# Patient Record
Sex: FEMALE | URBAN - METROPOLITAN AREA
[De-identification: names, ages, dates, MRNs, and addresses within clinical notes are randomized per-mention and may not be internally consistent; named-entity substitution may affect disease eponyms.]

---

## 2018-03-22 ENCOUNTER — HOSPITAL ENCOUNTER (OUTPATIENT)
Dept: MAMMOGRAPHY | Age: 68
Discharge: HOME | End: 2018-03-22
Admitting: FAMILY MEDICINE

## 2018-03-22 DIAGNOSIS — Z12.31 ENCOUNTER FOR SCREENING MAMMOGRAM FOR BREAST CANCER: ICD-10-CM

## 2018-03-22 DIAGNOSIS — Z78.0 POSTMENOPAUSAL: ICD-10-CM

## 2019-10-25 ENCOUNTER — APPOINTMENT (OUTPATIENT)
Dept: GENERAL RADIOLOGY | Age: 69
End: 2019-10-25
Payer: COMMERCIAL

## 2019-10-25 ENCOUNTER — HOSPITAL ENCOUNTER (EMERGENCY)
Age: 69
Discharge: HOME | End: 2019-10-26
Attending: EMERGENCY MEDICINE
Payer: COMMERCIAL

## 2019-10-25 DIAGNOSIS — T50.B95A ADVERSE EFFECT OF INFLUENZA VACCINE, INITIAL ENCOUNTER: ICD-10-CM

## 2019-10-25 DIAGNOSIS — R73.9 HYPERGLYCEMIA: Primary | ICD-10-CM

## 2019-10-25 DIAGNOSIS — Z86.39 HX OF DIABETES MELLITUS: ICD-10-CM

## 2019-10-25 PROCEDURE — 96360 HYDRATION IV INFUSION INIT: CPT

## 2019-10-25 PROCEDURE — 85025 COMPLETE CBC W/AUTO DIFF WBC: CPT

## 2019-10-25 PROCEDURE — 83690 ASSAY OF LIPASE: CPT

## 2019-10-25 PROCEDURE — 80053 COMPREHEN METABOLIC PANEL: CPT

## 2019-10-25 PROCEDURE — 81003 URINALYSIS AUTO W/O SCOPE: CPT

## 2019-10-25 PROCEDURE — 99283 EMERGENCY DEPT VISIT LOW MDM: CPT

## 2019-10-25 PROCEDURE — 2580000003 HC RX 258: Performed by: PHYSICIAN ASSISTANT

## 2019-10-25 PROCEDURE — 71046 X-RAY EXAM CHEST 2 VIEWS: CPT

## 2019-10-25 PROCEDURE — 87804 INFLUENZA ASSAY W/OPTIC: CPT

## 2019-10-25 PROCEDURE — 6370000000 HC RX 637 (ALT 250 FOR IP): Performed by: PHYSICIAN ASSISTANT

## 2019-10-25 RX ORDER — ACETAMINOPHEN 500 MG
1000 TABLET ORAL ONCE
Status: COMPLETED | OUTPATIENT
Start: 2019-10-25 | End: 2019-10-25

## 2019-10-25 RX ORDER — IBUPROFEN 600 MG/1
600 TABLET ORAL ONCE
Status: COMPLETED | OUTPATIENT
Start: 2019-10-25 | End: 2019-10-25

## 2019-10-25 RX ORDER — LISINOPRIL 20 MG/1
20 TABLET ORAL DAILY
COMMUNITY

## 2019-10-25 RX ORDER — 0.9 % SODIUM CHLORIDE 0.9 %
1000 INTRAVENOUS SOLUTION INTRAVENOUS ONCE
Status: COMPLETED | OUTPATIENT
Start: 2019-10-25 | End: 2019-10-26

## 2019-10-25 RX ORDER — OMEPRAZOLE 20 MG/1
20 CAPSULE, DELAYED RELEASE ORAL DAILY
COMMUNITY

## 2019-10-25 RX ORDER — ATORVASTATIN CALCIUM 10 MG/1
10 TABLET, FILM COATED ORAL DAILY
COMMUNITY

## 2019-10-25 RX ADMIN — SODIUM CHLORIDE 1000 ML: 9 INJECTION, SOLUTION INTRAVENOUS at 23:51

## 2019-10-25 RX ADMIN — ACETAMINOPHEN 1000 MG: 500 TABLET, FILM COATED ORAL at 23:25

## 2019-10-25 RX ADMIN — IBUPROFEN 600 MG: 600 TABLET, FILM COATED ORAL at 23:26

## 2019-10-25 ASSESSMENT — ENCOUNTER SYMPTOMS
SHORTNESS OF BREATH: 0
VOMITING: 1
NAUSEA: 1
WHEEZING: 0
ABDOMINAL PAIN: 1
DIARRHEA: 0

## 2019-10-25 ASSESSMENT — PAIN SCALES - GENERAL
PAINLEVEL_OUTOF10: 9

## 2019-10-26 VITALS
RESPIRATION RATE: 18 BRPM | DIASTOLIC BLOOD PRESSURE: 72 MMHG | HEART RATE: 58 BPM | TEMPERATURE: 98 F | OXYGEN SATURATION: 98 % | SYSTOLIC BLOOD PRESSURE: 104 MMHG

## 2019-10-26 LAB
A/G RATIO: 1.4 (ref 1.1–2.2)
ALBUMIN SERPL-MCNC: 4.4 G/DL (ref 3.4–5)
ALP BLD-CCNC: 106 U/L (ref 40–129)
ALT SERPL-CCNC: 42 U/L (ref 10–40)
ANION GAP SERPL CALCULATED.3IONS-SCNC: 14 MMOL/L (ref 3–16)
AST SERPL-CCNC: 33 U/L (ref 15–37)
BASOPHILS ABSOLUTE: 0 K/UL (ref 0–0.2)
BASOPHILS RELATIVE PERCENT: 0.4 %
BILIRUB SERPL-MCNC: 1 MG/DL (ref 0–1)
BILIRUBIN URINE: NEGATIVE
BLOOD, URINE: NEGATIVE
BUN BLDV-MCNC: 5 MG/DL (ref 7–20)
CALCIUM SERPL-MCNC: 9.8 MG/DL (ref 8.3–10.6)
CHLORIDE BLD-SCNC: 99 MMOL/L (ref 99–110)
CLARITY: CLEAR
CO2: 22 MMOL/L (ref 21–32)
COLOR: YELLOW
CREAT SERPL-MCNC: 0.7 MG/DL (ref 0.6–1.2)
EOSINOPHILS ABSOLUTE: 0.1 K/UL (ref 0–0.6)
EOSINOPHILS RELATIVE PERCENT: 0.5 %
GFR AFRICAN AMERICAN: >60
GFR NON-AFRICAN AMERICAN: >60
GLOBULIN: 3.2 G/DL
GLUCOSE BLD-MCNC: 180 MG/DL (ref 70–99)
GLUCOSE URINE: NEGATIVE MG/DL
HCT VFR BLD CALC: 41.1 % (ref 36–48)
HEMOGLOBIN: 13.9 G/DL (ref 12–16)
KETONES, URINE: NEGATIVE MG/DL
LEUKOCYTE ESTERASE, URINE: NEGATIVE
LIPASE: 59 U/L (ref 13–60)
LYMPHOCYTES ABSOLUTE: 0.5 K/UL (ref 1–5.1)
LYMPHOCYTES RELATIVE PERCENT: 4.4 %
MCH RBC QN AUTO: 28.5 PG (ref 26–34)
MCHC RBC AUTO-ENTMCNC: 33.8 G/DL (ref 31–36)
MCV RBC AUTO: 84.2 FL (ref 80–100)
MICROSCOPIC EXAMINATION: NORMAL
MONOCYTES ABSOLUTE: 0.6 K/UL (ref 0–1.3)
MONOCYTES RELATIVE PERCENT: 5.3 %
NEUTROPHILS ABSOLUTE: 10.1 K/UL (ref 1.7–7.7)
NEUTROPHILS RELATIVE PERCENT: 89.4 %
NITRITE, URINE: NEGATIVE
PDW BLD-RTO: 14.6 % (ref 12.4–15.4)
PH UA: 7.5 (ref 5–8)
PLATELET # BLD: 138 K/UL (ref 135–450)
PMV BLD AUTO: 9.6 FL (ref 5–10.5)
POTASSIUM REFLEX MAGNESIUM: 4.3 MMOL/L (ref 3.5–5.1)
PROTEIN UA: NEGATIVE MG/DL
RAPID INFLUENZA  B AGN: NEGATIVE
RAPID INFLUENZA A AGN: NEGATIVE
RBC # BLD: 4.89 M/UL (ref 4–5.2)
SODIUM BLD-SCNC: 135 MMOL/L (ref 136–145)
SPECIFIC GRAVITY UA: 1.01 (ref 1–1.03)
TOTAL PROTEIN: 7.6 G/DL (ref 6.4–8.2)
URINE REFLEX TO CULTURE: NORMAL
URINE TYPE: NORMAL
UROBILINOGEN, URINE: 0.2 E.U./DL
WBC # BLD: 11.3 K/UL (ref 4–11)

## 2023-07-23 ENCOUNTER — TELEPHONE (OUTPATIENT)
Dept: ORTHOPEDIC SURGERY | Age: 73
End: 2023-07-23

## 2023-09-25 ENCOUNTER — HOSPITAL ENCOUNTER (INPATIENT)
Age: 73
LOS: 3 days | Discharge: HOME | End: 2023-09-28
Attending: INTERNAL MEDICINE | Admitting: STUDENT IN AN ORGANIZED HEALTH CARE EDUCATION/TRAINING PROGRAM
Payer: MEDICAID

## 2023-09-25 ENCOUNTER — APPOINTMENT (OUTPATIENT)
Dept: CT IMAGING | Age: 73
End: 2023-09-25
Payer: MEDICAID

## 2023-09-25 DIAGNOSIS — R11.14 BILIOUS VOMITING WITH NAUSEA: ICD-10-CM

## 2023-09-25 DIAGNOSIS — E87.1 HYPONATREMIA: ICD-10-CM

## 2023-09-25 DIAGNOSIS — R10.9 ACUTE ABDOMINAL PAIN: ICD-10-CM

## 2023-09-25 DIAGNOSIS — R00.1 SYMPTOMATIC BRADYCARDIA: Primary | ICD-10-CM

## 2023-09-25 LAB
ALBUMIN SERPL-MCNC: 4.3 G/DL (ref 3.4–5)
ALP SERPL-CCNC: 109 U/L (ref 40–129)
ALT SERPL-CCNC: 19 U/L (ref 10–40)
ANION GAP SERPL CALCULATED.3IONS-SCNC: 11 MMOL/L (ref 3–16)
AST SERPL-CCNC: 23 U/L (ref 15–37)
BACTERIA URNS QL MICRO: NORMAL /HPF
BASOPHILS # BLD: 0 K/UL (ref 0–0.2)
BASOPHILS NFR BLD: 0.3 %
BILIRUB DIRECT SERPL-MCNC: <0.2 MG/DL (ref 0–0.3)
BILIRUB INDIRECT SERPL-MCNC: NORMAL MG/DL (ref 0–1)
BILIRUB SERPL-MCNC: 0.7 MG/DL (ref 0–1)
BILIRUB UR QL STRIP.AUTO: NEGATIVE
BUN SERPL-MCNC: 5 MG/DL (ref 7–20)
CALCIUM SERPL-MCNC: 9.5 MG/DL (ref 8.3–10.6)
CHLORIDE SERPL-SCNC: 96 MMOL/L (ref 99–110)
CLARITY UR: CLEAR
CO2 SERPL-SCNC: 23 MMOL/L (ref 21–32)
COLOR UR: YELLOW
CREAT SERPL-MCNC: 0.7 MG/DL (ref 0.6–1.2)
DEPRECATED RDW RBC AUTO: 17.4 % (ref 12.4–15.4)
EOSINOPHIL # BLD: 0.1 K/UL (ref 0–0.6)
EOSINOPHIL NFR BLD: 0.5 %
EPI CELLS #/AREA URNS AUTO: 1 /HPF (ref 0–5)
GFR SERPLBLD CREATININE-BSD FMLA CKD-EPI: >60 ML/MIN/{1.73_M2}
GLUCOSE SERPL-MCNC: 147 MG/DL (ref 70–99)
GLUCOSE UR STRIP.AUTO-MCNC: NEGATIVE MG/DL
HCT VFR BLD AUTO: 34.2 % (ref 36–48)
HGB BLD-MCNC: 10.9 G/DL (ref 12–16)
HGB UR QL STRIP.AUTO: NEGATIVE
HYALINE CASTS #/AREA URNS AUTO: 0 /LPF (ref 0–8)
KETONES UR STRIP.AUTO-MCNC: NEGATIVE MG/DL
LEUKOCYTE ESTERASE UR QL STRIP.AUTO: ABNORMAL
LIPASE SERPL-CCNC: 42 U/L (ref 13–60)
LYMPHOCYTES # BLD: 0.6 K/UL (ref 1–5.1)
LYMPHOCYTES NFR BLD: 5.3 %
MCH RBC QN AUTO: 24.4 PG (ref 26–34)
MCHC RBC AUTO-ENTMCNC: 32 G/DL (ref 31–36)
MCV RBC AUTO: 76.1 FL (ref 80–100)
MONOCYTES # BLD: 0.8 K/UL (ref 0–1.3)
MONOCYTES NFR BLD: 7.6 %
NEUTROPHILS # BLD: 9.5 K/UL (ref 1.7–7.7)
NEUTROPHILS NFR BLD: 86.3 %
NITRITE UR QL STRIP.AUTO: NEGATIVE
PH UR STRIP.AUTO: 7 [PH] (ref 5–8)
PLATELET # BLD AUTO: 151 K/UL (ref 135–450)
PMV BLD AUTO: 9 FL (ref 5–10.5)
POTASSIUM SERPL-SCNC: 4.6 MMOL/L (ref 3.5–5.1)
PROT SERPL-MCNC: 7.6 G/DL (ref 6.4–8.2)
PROT UR STRIP.AUTO-MCNC: NEGATIVE MG/DL
RBC # BLD AUTO: 4.5 M/UL (ref 4–5.2)
RBC CLUMPS #/AREA URNS AUTO: 0 /HPF (ref 0–4)
SODIUM SERPL-SCNC: 130 MMOL/L (ref 136–145)
SP GR UR STRIP.AUTO: 1.01 (ref 1–1.03)
TROPONIN, HIGH SENSITIVITY: 13 NG/L (ref 0–14)
UA COMPLETE W REFLEX CULTURE PNL UR: ABNORMAL
UA DIPSTICK W REFLEX MICRO PNL UR: YES
URN SPEC COLLECT METH UR: ABNORMAL
UROBILINOGEN UR STRIP-ACNC: 0.2 E.U./DL
WBC # BLD AUTO: 11 K/UL (ref 4–11)
WBC #/AREA URNS AUTO: 1 /HPF (ref 0–5)

## 2023-09-25 PROCEDURE — 96375 TX/PRO/DX INJ NEW DRUG ADDON: CPT

## 2023-09-25 PROCEDURE — 2000000000 HC ICU R&B

## 2023-09-25 PROCEDURE — 80048 BASIC METABOLIC PNL TOTAL CA: CPT

## 2023-09-25 PROCEDURE — 74177 CT ABD & PELVIS W/CONTRAST: CPT

## 2023-09-25 PROCEDURE — 96374 THER/PROPH/DIAG INJ IV PUSH: CPT

## 2023-09-25 PROCEDURE — 93005 ELECTROCARDIOGRAM TRACING: CPT | Performed by: INTERNAL MEDICINE

## 2023-09-25 PROCEDURE — 83690 ASSAY OF LIPASE: CPT

## 2023-09-25 PROCEDURE — 6360000002 HC RX W HCPCS: Performed by: INTERNAL MEDICINE

## 2023-09-25 PROCEDURE — 80076 HEPATIC FUNCTION PANEL: CPT

## 2023-09-25 PROCEDURE — 81001 URINALYSIS AUTO W/SCOPE: CPT

## 2023-09-25 PROCEDURE — 99285 EMERGENCY DEPT VISIT HI MDM: CPT

## 2023-09-25 PROCEDURE — 84484 ASSAY OF TROPONIN QUANT: CPT

## 2023-09-25 PROCEDURE — 85025 COMPLETE CBC W/AUTO DIFF WBC: CPT

## 2023-09-25 PROCEDURE — 2580000003 HC RX 258: Performed by: INTERNAL MEDICINE

## 2023-09-25 PROCEDURE — 96361 HYDRATE IV INFUSION ADD-ON: CPT

## 2023-09-25 PROCEDURE — 6360000004 HC RX CONTRAST MEDICATION: Performed by: INTERNAL MEDICINE

## 2023-09-25 RX ORDER — INSULIN LISPRO 100 [IU]/ML
0-8 INJECTION, SOLUTION INTRAVENOUS; SUBCUTANEOUS
Status: DISCONTINUED | OUTPATIENT
Start: 2023-09-26 | End: 2023-09-26

## 2023-09-25 RX ORDER — ONDANSETRON 2 MG/ML
4 INJECTION INTRAMUSCULAR; INTRAVENOUS EVERY 6 HOURS PRN
Status: DISCONTINUED | OUTPATIENT
Start: 2023-09-25 | End: 2023-09-28 | Stop reason: HOSPADM

## 2023-09-25 RX ORDER — LORATADINE 10 MG/1
10 TABLET ORAL DAILY
COMMUNITY

## 2023-09-25 RX ORDER — ONDANSETRON 2 MG/ML
4 INJECTION INTRAMUSCULAR; INTRAVENOUS ONCE
Status: COMPLETED | OUTPATIENT
Start: 2023-09-25 | End: 2023-09-25

## 2023-09-25 RX ORDER — SODIUM CHLORIDE 9 MG/ML
INJECTION, SOLUTION INTRAVENOUS PRN
Status: DISCONTINUED | OUTPATIENT
Start: 2023-09-25 | End: 2023-09-28 | Stop reason: HOSPADM

## 2023-09-25 RX ORDER — LISINOPRIL 20 MG/1
20 TABLET ORAL DAILY
Status: DISCONTINUED | OUTPATIENT
Start: 2023-09-26 | End: 2023-09-25

## 2023-09-25 RX ORDER — PANTOPRAZOLE SODIUM 40 MG/1
40 TABLET, DELAYED RELEASE ORAL
Status: DISCONTINUED | OUTPATIENT
Start: 2023-09-26 | End: 2023-09-28 | Stop reason: HOSPADM

## 2023-09-25 RX ORDER — VALSARTAN 80 MG/1
80 TABLET ORAL DAILY
COMMUNITY

## 2023-09-25 RX ORDER — DEXTROSE MONOHYDRATE 100 MG/ML
INJECTION, SOLUTION INTRAVENOUS CONTINUOUS PRN
Status: DISCONTINUED | OUTPATIENT
Start: 2023-09-25 | End: 2023-09-28 | Stop reason: SDUPTHER

## 2023-09-25 RX ORDER — FENTANYL CITRATE 50 UG/ML
25 INJECTION, SOLUTION INTRAMUSCULAR; INTRAVENOUS ONCE
Status: COMPLETED | OUTPATIENT
Start: 2023-09-25 | End: 2023-09-25

## 2023-09-25 RX ORDER — ONDANSETRON 4 MG/1
4 TABLET, ORALLY DISINTEGRATING ORAL EVERY 8 HOURS PRN
Status: DISCONTINUED | OUTPATIENT
Start: 2023-09-25 | End: 2023-09-28 | Stop reason: HOSPADM

## 2023-09-25 RX ORDER — ENOXAPARIN SODIUM 100 MG/ML
40 INJECTION SUBCUTANEOUS DAILY
Status: DISCONTINUED | OUTPATIENT
Start: 2023-09-26 | End: 2023-09-26

## 2023-09-25 RX ORDER — ACETAMINOPHEN 650 MG/1
650 SUPPOSITORY RECTAL EVERY 6 HOURS PRN
Status: DISCONTINUED | OUTPATIENT
Start: 2023-09-25 | End: 2023-09-28 | Stop reason: HOSPADM

## 2023-09-25 RX ORDER — GLUCAGON 1 MG/ML
1 KIT INJECTION PRN
Status: DISCONTINUED | OUTPATIENT
Start: 2023-09-25 | End: 2023-09-28 | Stop reason: SDUPTHER

## 2023-09-25 RX ORDER — SODIUM CHLORIDE 0.9 % (FLUSH) 0.9 %
5-40 SYRINGE (ML) INJECTION EVERY 12 HOURS SCHEDULED
Status: DISCONTINUED | OUTPATIENT
Start: 2023-09-25 | End: 2023-09-28 | Stop reason: HOSPADM

## 2023-09-25 RX ORDER — 0.9 % SODIUM CHLORIDE 0.9 %
500 INTRAVENOUS SOLUTION INTRAVENOUS ONCE
Status: COMPLETED | OUTPATIENT
Start: 2023-09-25 | End: 2023-09-25

## 2023-09-25 RX ORDER — INSULIN LISPRO 100 [IU]/ML
0-4 INJECTION, SOLUTION INTRAVENOUS; SUBCUTANEOUS NIGHTLY
Status: DISCONTINUED | OUTPATIENT
Start: 2023-09-25 | End: 2023-09-26 | Stop reason: DRUGHIGH

## 2023-09-25 RX ORDER — ACETAMINOPHEN 325 MG/1
650 TABLET ORAL EVERY 6 HOURS PRN
Status: DISCONTINUED | OUTPATIENT
Start: 2023-09-25 | End: 2023-09-28 | Stop reason: HOSPADM

## 2023-09-25 RX ORDER — ATROPINE SULFATE 0.1 MG/ML
0.5 INJECTION INTRAVENOUS ONCE
Status: COMPLETED | OUTPATIENT
Start: 2023-09-25 | End: 2023-09-25

## 2023-09-25 RX ORDER — POLYETHYLENE GLYCOL 3350 17 G/17G
17 POWDER, FOR SOLUTION ORAL DAILY PRN
Status: DISCONTINUED | OUTPATIENT
Start: 2023-09-25 | End: 2023-09-28 | Stop reason: HOSPADM

## 2023-09-25 RX ORDER — SODIUM CHLORIDE 0.9 % (FLUSH) 0.9 %
5-40 SYRINGE (ML) INJECTION PRN
Status: DISCONTINUED | OUTPATIENT
Start: 2023-09-25 | End: 2023-09-28 | Stop reason: HOSPADM

## 2023-09-25 RX ORDER — ATORVASTATIN CALCIUM 10 MG/1
10 TABLET, FILM COATED ORAL DAILY
Status: DISCONTINUED | OUTPATIENT
Start: 2023-09-26 | End: 2023-09-28 | Stop reason: HOSPADM

## 2023-09-25 RX ADMIN — SODIUM CHLORIDE 500 ML: 9 INJECTION, SOLUTION INTRAVENOUS at 17:11

## 2023-09-25 RX ADMIN — ATROPINE SULFATE 0.5 MG: 0.1 INJECTION, SOLUTION ENDOTRACHEAL; INTRAMUSCULAR; INTRAVENOUS; SUBCUTANEOUS at 20:50

## 2023-09-25 RX ADMIN — IOPAMIDOL 75 ML: 755 INJECTION, SOLUTION INTRAVENOUS at 17:40

## 2023-09-25 RX ADMIN — FENTANYL CITRATE 25 MCG: 0.05 INJECTION, SOLUTION INTRAMUSCULAR; INTRAVENOUS at 17:03

## 2023-09-25 RX ADMIN — ONDANSETRON 4 MG: 2 INJECTION INTRAMUSCULAR; INTRAVENOUS at 17:10

## 2023-09-25 ASSESSMENT — PAIN SCALES - GENERAL
PAINLEVEL_OUTOF10: 0
PAINLEVEL_OUTOF10: 10
PAINLEVEL_OUTOF10: 9

## 2023-09-25 ASSESSMENT — PAIN DESCRIPTION - PAIN TYPE: TYPE: ACUTE PAIN

## 2023-09-25 ASSESSMENT — PAIN - FUNCTIONAL ASSESSMENT: PAIN_FUNCTIONAL_ASSESSMENT: 0-10

## 2023-09-25 ASSESSMENT — PAIN DESCRIPTION - LOCATION
LOCATION: ABDOMEN
LOCATION: ABDOMEN;HEAD

## 2023-09-25 ASSESSMENT — PAIN DESCRIPTION - DESCRIPTORS: DESCRIPTORS: ACHING

## 2023-09-25 NOTE — ED PROVIDER NOTES
EMERGENCY MEDICINE PROVIDER NOTE    Patient Identification  Pt Name: Luli Barrett  MRN: [id number]  [address] [facility] 1950  Date of evaluation: 9/25/2023  Provider: Miguel Rosas DO  PCP: [address] [place] [address] [address]    Chief Complaint  Emesis and Abdominal Pain (Used English - c/o abd pain and vomiting since this morning x 4 episodes. Denies fevers. )      HPI  (History provided by patient)  This is a 67 y.o. female who was brought in by self for generalized abdominal pain along with nausea and vomiting x4 started this morning. The pain is moderate to severe in nature. Patient did not take any medication prior to arrival.  She does have some back pain. She is also complaining of some diarrhea. She denies constipation. Patient denies fever and chills. Patient does speak English and  was used. I have reviewed the following nursing documentation:  Allergies: Patient has no known allergies. Past medical history:   Past Medical History:   Diagnosis Date    Diabetes mellitus ([address])      Past surgical history: History reviewed. No pertinent surgical history. Home medications:   Previous Medications    ATORVASTATIN (LIPITOR) 10 MG TABLET    Take 10 mg by mouth daily    CALCIUM CARBONATE (OSCAL) 500 MG TABS TABLET    Take 500 mg by mouth daily. LISINOPRIL (PRINIVIL;ZESTRIL) 20 MG TABLET    Take 20 mg by mouth daily    METFORMIN (GLUCOPHAGE) 500 MG TABLET    Take 500 mg by mouth daily (with breakfast). OMEPRAZOLE (PRILOSEC) 20 MG DELAYED RELEASE CAPSULE    Take 20 mg by mouth daily    SIMVASTATIN (ZOCOR) 10 MG TABLET    Take 10 mg by mouth nightly. Social history:  reports that she has never smoked. She has never used smokeless tobacco. She reports that she does not drink alcohol and does not use drugs. Family history:  History reviewed. No pertinent family history.     Exam  ED Triage Vitals [09/25/23 1500]   BP Temp Temp Source Pulse Respirations SpO2 Height Weight   (!) 170/72

## 2023-09-26 PROBLEM — I15.2 HYPERTENSION ASSOCIATED WITH DIABETES (HCC): Status: ACTIVE | Noted: 2023-09-26

## 2023-09-26 PROBLEM — E78.5 HYPERLIPIDEMIA ASSOCIATED WITH TYPE 2 DIABETES MELLITUS (HCC): Status: ACTIVE | Noted: 2023-09-26

## 2023-09-26 PROBLEM — E11.59 HYPERTENSION ASSOCIATED WITH DIABETES (HCC): Status: ACTIVE | Noted: 2023-09-26

## 2023-09-26 PROBLEM — R10.9 ACUTE ABDOMINAL PAIN: Status: ACTIVE | Noted: 2023-09-26

## 2023-09-26 PROBLEM — E11.69 HYPERLIPIDEMIA ASSOCIATED WITH TYPE 2 DIABETES MELLITUS (HCC): Status: ACTIVE | Noted: 2023-09-26

## 2023-09-26 PROBLEM — E78.2 MIXED HYPERLIPIDEMIA: Status: ACTIVE | Noted: 2023-09-26

## 2023-09-26 PROBLEM — E11.65 TYPE 2 DIABETES MELLITUS WITH HYPERGLYCEMIA, WITHOUT LONG-TERM CURRENT USE OF INSULIN (HCC): Status: ACTIVE | Noted: 2023-09-26

## 2023-09-26 LAB
ANION GAP SERPL CALCULATED.3IONS-SCNC: 8 MMOL/L (ref 3–16)
BASOPHILS # BLD: 0 K/UL (ref 0–0.2)
BASOPHILS NFR BLD: 0.3 %
BUN SERPL-MCNC: 5 MG/DL (ref 7–20)
CALCIUM SERPL-MCNC: 8.8 MG/DL (ref 8.3–10.6)
CHLORIDE SERPL-SCNC: 106 MMOL/L (ref 99–110)
CO2 SERPL-SCNC: 25 MMOL/L (ref 21–32)
CORTIS AM PEAK SERPL-MCNC: 15.2 UG/DL (ref 4.3–22.4)
CREAT SERPL-MCNC: 0.7 MG/DL (ref 0.6–1.2)
DEPRECATED RDW RBC AUTO: 17.5 % (ref 12.4–15.4)
EKG ATRIAL RATE: 50 BPM
EKG DIAGNOSIS: NORMAL
EKG P AXIS: 12 DEGREES
EKG P-R INTERVAL: 128 MS
EKG Q-T INTERVAL: 466 MS
EKG QRS DURATION: 94 MS
EKG QTC CALCULATION (BAZETT): 424 MS
EKG R AXIS: 20 DEGREES
EKG T AXIS: 65 DEGREES
EKG VENTRICULAR RATE: 50 BPM
EOSINOPHIL # BLD: 0.1 K/UL (ref 0–0.6)
EOSINOPHIL NFR BLD: 1.3 %
GFR SERPLBLD CREATININE-BSD FMLA CKD-EPI: >60 ML/MIN/{1.73_M2}
GLUCOSE BLD-MCNC: 101 MG/DL (ref 70–99)
GLUCOSE BLD-MCNC: 155 MG/DL (ref 70–99)
GLUCOSE BLD-MCNC: 171 MG/DL (ref 70–99)
GLUCOSE BLD-MCNC: 190 MG/DL (ref 70–99)
GLUCOSE SERPL-MCNC: 165 MG/DL (ref 70–99)
HCT VFR BLD AUTO: 31.1 % (ref 36–48)
HGB BLD-MCNC: 10 G/DL (ref 12–16)
LYMPHOCYTES # BLD: 1.1 K/UL (ref 1–5.1)
LYMPHOCYTES NFR BLD: 25.6 %
MCH RBC QN AUTO: 24.4 PG (ref 26–34)
MCHC RBC AUTO-ENTMCNC: 32.2 G/DL (ref 31–36)
MCV RBC AUTO: 75.7 FL (ref 80–100)
MONOCYTES # BLD: 0.5 K/UL (ref 0–1.3)
MONOCYTES NFR BLD: 11 %
NEUTROPHILS # BLD: 2.5 K/UL (ref 1.7–7.7)
NEUTROPHILS NFR BLD: 61.8 %
PERFORMED ON: ABNORMAL
PLATELET # BLD AUTO: 148 K/UL (ref 135–450)
PMV BLD AUTO: 9.2 FL (ref 5–10.5)
POTASSIUM SERPL-SCNC: 4.3 MMOL/L (ref 3.5–5.1)
RBC # BLD AUTO: 4.11 M/UL (ref 4–5.2)
SODIUM SERPL-SCNC: 139 MMOL/L (ref 136–145)
TROPONIN, HIGH SENSITIVITY: 11 NG/L (ref 0–14)
TROPONIN, HIGH SENSITIVITY: 11 NG/L (ref 0–14)
TROPONIN, HIGH SENSITIVITY: 12 NG/L (ref 0–14)
TSH SERPL DL<=0.005 MIU/L-ACNC: 0.74 UIU/ML (ref 0.27–4.2)
WBC # BLD AUTO: 4.1 K/UL (ref 4–11)

## 2023-09-26 PROCEDURE — 2580000003 HC RX 258: Performed by: STUDENT IN AN ORGANIZED HEALTH CARE EDUCATION/TRAINING PROGRAM

## 2023-09-26 PROCEDURE — 6360000002 HC RX W HCPCS: Performed by: STUDENT IN AN ORGANIZED HEALTH CARE EDUCATION/TRAINING PROGRAM

## 2023-09-26 PROCEDURE — 93010 ELECTROCARDIOGRAM REPORT: CPT | Performed by: INTERNAL MEDICINE

## 2023-09-26 PROCEDURE — 85025 COMPLETE CBC W/AUTO DIFF WBC: CPT

## 2023-09-26 PROCEDURE — 99223 1ST HOSP IP/OBS HIGH 75: CPT | Performed by: INTERNAL MEDICINE

## 2023-09-26 PROCEDURE — 36415 COLL VENOUS BLD VENIPUNCTURE: CPT

## 2023-09-26 PROCEDURE — 6370000000 HC RX 637 (ALT 250 FOR IP): Performed by: STUDENT IN AN ORGANIZED HEALTH CARE EDUCATION/TRAINING PROGRAM

## 2023-09-26 PROCEDURE — 1200000000 HC SEMI PRIVATE

## 2023-09-26 PROCEDURE — 84484 ASSAY OF TROPONIN QUANT: CPT

## 2023-09-26 PROCEDURE — 93306 TTE W/DOPPLER COMPLETE: CPT

## 2023-09-26 PROCEDURE — 84443 ASSAY THYROID STIM HORMONE: CPT

## 2023-09-26 PROCEDURE — 82533 TOTAL CORTISOL: CPT

## 2023-09-26 PROCEDURE — 80048 BASIC METABOLIC PNL TOTAL CA: CPT

## 2023-09-26 PROCEDURE — 93017 CV STRESS TEST TRACING ONLY: CPT | Performed by: INTERNAL MEDICINE

## 2023-09-26 RX ORDER — ATROPINE SULFATE 0.1 MG/ML
1 INJECTION INTRAVENOUS EVERY 4 HOURS PRN
Status: DISCONTINUED | OUTPATIENT
Start: 2023-09-26 | End: 2023-09-26

## 2023-09-26 RX ORDER — DOPAMINE HYDROCHLORIDE 160 MG/100ML
1-20 INJECTION, SOLUTION INTRAVENOUS CONTINUOUS
Status: DISCONTINUED | OUTPATIENT
Start: 2023-09-26 | End: 2023-09-26

## 2023-09-26 RX ORDER — SODIUM CHLORIDE 9 MG/ML
INJECTION, SOLUTION INTRAVENOUS CONTINUOUS
Status: ACTIVE | OUTPATIENT
Start: 2023-09-26 | End: 2023-09-26

## 2023-09-26 RX ORDER — INSULIN LISPRO 100 [IU]/ML
0-8 INJECTION, SOLUTION INTRAVENOUS; SUBCUTANEOUS EVERY 6 HOURS
Status: DISCONTINUED | OUTPATIENT
Start: 2023-09-26 | End: 2023-09-28

## 2023-09-26 RX ORDER — ENOXAPARIN SODIUM 100 MG/ML
30 INJECTION SUBCUTANEOUS DAILY
Status: DISCONTINUED | OUTPATIENT
Start: 2023-09-26 | End: 2023-09-27 | Stop reason: ALTCHOICE

## 2023-09-26 RX ADMIN — SODIUM CHLORIDE: 9 INJECTION, SOLUTION INTRAVENOUS at 03:20

## 2023-09-26 RX ADMIN — SODIUM CHLORIDE, PRESERVATIVE FREE 10 ML: 5 INJECTION INTRAVENOUS at 00:20

## 2023-09-26 RX ADMIN — DOPAMINE HYDROCHLORIDE 3 MCG/KG/MIN: 160 INJECTION, SOLUTION INTRAVENOUS at 03:15

## 2023-09-26 RX ADMIN — ATROPINE SULFATE 1 MG: 0.1 INJECTION INTRAVENOUS at 04:00

## 2023-09-26 RX ADMIN — SODIUM CHLORIDE, PRESERVATIVE FREE 10 ML: 5 INJECTION INTRAVENOUS at 08:56

## 2023-09-26 RX ADMIN — ATORVASTATIN CALCIUM 10 MG: 10 TABLET, FILM COATED ORAL at 08:56

## 2023-09-26 RX ADMIN — SODIUM CHLORIDE, PRESERVATIVE FREE 10 ML: 5 INJECTION INTRAVENOUS at 20:53

## 2023-09-26 ASSESSMENT — PAIN SCALES - GENERAL
PAINLEVEL_OUTOF10: 0
PAINLEVEL_OUTOF10: 0

## 2023-09-26 NOTE — H&P
Negative 09/25/2023 06:36 PM    COLORU Yellow 09/25/2023 06:36 PM    PHUR 7.0 09/25/2023 06:36 PM    WBCUA 1 09/25/2023 06:36 PM    RBCUA 0 09/25/2023 06:36 PM    BACTERIA None Seen 09/25/2023 06:36 PM    CLARITYU Clear 09/25/2023 06:36 PM    SPECGRAV 1.015 09/25/2023 06:36 PM    LEUKOCYTESUR TRACE 09/25/2023 06:36 PM    UROBILINOGEN 0.2 09/25/2023 06:36 PM    BILIRUBINUR Negative 09/25/2023 06:36 PM    BILIRUBINUR Negative 06/27/2011 02:03 PM    BLOODU Negative 09/25/2023 06:36 PM    GLUCOSEU Negative 09/25/2023 06:36 PM    GLUCOSEU Negative 06/27/2011 02:03 PM    KETUA Negative 09/25/2023 06:36 PM     Urine Cultures: No results found for: \"LABURIN\"  Blood Cultures: No results found for: \"BC\"  No results found for: \"BLOODCULT2\"  Organism: No results found for: \"ORG\"    Imaging/Diagnostics Last 24 Hours   CT ABDOMEN PELVIS W IV CONTRAST Additional Contrast? None    Result Date: 9/25/2023  EXAMINATION: CT OF THE ABDOMEN AND PELVIS WITH CONTRAST 9/25/2023 4:57 pm TECHNIQUE: CT of the abdomen and pelvis was performed with the administration of intravenous contrast. Multiplanar reformatted images are provided for review. Automated exposure control, iterative reconstruction, and/or weight based adjustment of the mA/kV was utilized to reduce the radiation dose to as low as reasonably achievable. COMPARISON: None. HISTORY: ORDERING SYSTEM PROVIDED HISTORY: abd pain diffuse, N/V TECHNOLOGIST PROVIDED HISTORY: Reason for exam:->abd pain diffuse, N/V Additional Contrast?->None Decision Support Exception - unselect if not a suspected or confirmed emergency medical condition->Emergency Medical Condition (MA) Reason for Exam: Abd pain diffuse, N/V. Abdominal Pain (Used [facility] - c/o abd pain and vomiting since this morning x 4 episodes. Denies fevers. ). FINDINGS: Lower Chest:   Unremarkable. Organs:   Proximal pancreatic duct appears upper limits of normal in caliber. No focal pancreatic parenchymal lesion identified. Small simple right renal cyst measuring 2.1 cm. Otherwise unremarkable. GI/Bowel:   No acute obstruction. Probable mild constipation. Few colonic diverticula without acute diverticulitis. No acute appendicitis. No free air. No drainable fluid collection. Pelvis:   Unremarkable. Peritoneum/Retroperitoneum:   Mild-to-moderate atherosclerotic calcification of the abdominal aorta and major branch vessels. No adenopathy. Bones/Soft Tissues: No acute fracture or aggressive osseous lesion. Transitional lumbosacral anatomy. No acute abdominopelvic process. Additional nonemergent findings, as above. This note was likely completed using voice recognition technology and may contain unintended errors.      Electronically signed by Kiersten Chao MD on 9/26/2023 at 7:25 AM

## 2023-09-26 NOTE — PROGRESS NOTES
MD at bedside- verbal order to stop dopamine- walk patient around unit in 30-45 mins to see what patient heart rate does, if it is slow pt will require pace maker, language services used to communicate with family, care ongoing

## 2023-09-26 NOTE — PROGRESS NOTES
Pt arrived from ED. Transported via bed. Oriented and alert. Sinus Bradycardia. Asymptomatic. BP WNL. Ventilating on room air with good sats. No cough nor SOB noted. A bit unsteady on her legs when ambulating. Ambulates with hand held support. Urinary and bowel continent. Only speaks [place] language. Tried the  ipad but there were no available . NORBERT Devi helped with the translation making sure pt is not in pain and settled. Completed some of the admission checklist.  Properties documented. No other complaints noted.

## 2023-09-26 NOTE — PROGRESS NOTES
TRANSFER - OUT REPORT:    Verbal report given to *** on Loida Nolan  being transferred to [address] [place] [address] for routine progression of patient care       Report consisted of patient's Situation, Background, Assessment and   Recommendations(SBAR). Information from the following report(s) Nurse Handoff Report and Cardiac Rhythm SB  was reviewed with the receiving nurse. Lines:   Peripheral IV 09/25/23 Right;Dorsal Forearm (Active)   Site Assessment Clean, dry & intact 09/26/23 1600   Line Status Infusing 09/26/23 1600   Line Care Connections checked and tightened 09/26/23 1600   Phlebitis Assessment No symptoms 09/26/23 1600   Infiltration Assessment 0 09/26/23 1600   Alcohol Cap Used Yes 09/26/23 1600   Dressing Status Clean, dry & intact 09/26/23 1600   Dressing Type Transparent 09/26/23 1600        Opportunity for questions and clarification was provided.       Patient transported with:  Monitor

## 2023-09-26 NOTE — CONSULTS
Session ID: [id number]  Language: [facility]   ID: #[id number]   Name: Jacob Lemus

## 2023-09-26 NOTE — PROGRESS NOTES
Sinus bradycardia noted. HR has been dipping < 35 bpm several times. Dr. Song Revels already informed. Ordered and given Dopamine to be infused at 3 mcg/hr and NS at 75 mls/hr. Given atropine 1mg as prescribed. Pt is asymptomatic during this bradycardia episodes. BP reading >65mmHg. No other complaints noted.

## 2023-09-26 NOTE — ED NOTES
ED TO INPATIENT SBAR HANDOFF    Patient Name: Verito Guy   :  1950  67 y.o. MRN:  [id number]  Preferred Name    ED Room #:  ED-0023/  Family/Caregiver Present yes   Restraints no   Sitter no   Sepsis Risk Score Sepsis Risk Score: 1.22    Situation  Code Status: No Order No additional code details. Allergies: Patient has no known allergies. Weight: No data found. Arrived from: home  Chief Complaint:   Chief Complaint   Patient presents with    Emesis    Abdominal Pain     Used Maltese - c/o abd pain and vomiting since this morning x 4 episodes. Denies fevers. Hospital Problem/Diagnosis:  Principal Problem:    Symptomatic bradycardia  Resolved Problems:    * No resolved hospital problems. *    Imaging:   CT ABDOMEN PELVIS W IV CONTRAST Additional Contrast? None   Final Result   No acute abdominopelvic process. Additional nonemergent findings, as above.            Abnormal labs:   Abnormal Labs Reviewed   CBC WITH AUTO DIFFERENTIAL - Abnormal; Notable for the following components:       Result Value    Hemoglobin 10.9 (*)     Hematocrit 34.2 (*)     MCV 76.1 (*)     MCH 24.4 (*)     RDW 17.4 (*)     Neutrophils Absolute 9.5 (*)     Lymphocytes Absolute 0.6 (*)     All other components within normal limits   BASIC METABOLIC PANEL - Abnormal; Notable for the following components:    Sodium 130 (*)     Chloride 96 (*)     Glucose 147 (*)     BUN 5 (*)     All other components within normal limits   URINALYSIS WITH REFLEX TO CULTURE - Abnormal; Notable for the following components:    Leukocyte Esterase, Urine TRACE (*)     All other components within normal limits     Critical values: no     Abnormal Assessment Findings:     Background  History:   Past Medical History:   Diagnosis Date    Diabetes mellitus ([address])        Assessment    Vitals/MEWS: MEWS Score: 1  Level of Consciousness: Alert (0)   Vitals:    23 1945 23   BP: (!) 118/55 (!)

## 2023-09-26 NOTE — PROGRESS NOTES
Patient instructed on Erwin Protocol Stress Test Procedure including possible side effects. Verbalizes knowledge and understanding with  and daughter at bedside. Gait unsteady and pt very weak. Assisted on treadmill with RN x2.

## 2023-09-26 NOTE — CONSULTS
[facility]   Electrophysiology Consultation   Date: 9/26/2023  Reason for Consultation: bradycardia  Consult Requesting Physician: Sylvester Torres DO     Chief Complaint   Patient presents with    Emesis    Abdominal Pain     Used Albanian - c/o abd pain and vomiting since this morning x 4 episodes. Denies fevers. HPI: Sushila Brandt is a 67 y.o. female with history of hypertension hyperlipidemia, diabetes who presented with abdominal pain and nausea. She had also vomited. She was noted to be bradycardic in the ER. She received 0.5 mg of atropine and felt better afterwards. She was started on dopamine overnight. This morning her heart rate was in 50s on dopamine. We will stop the dopamine and her heart rate later during the day went down between 37 and 45. We tried to do a stress test but she was too weak to do it. Her heart rate increased to 90 but then dropped back to 30 and she became symptomatic and dizzy. She has history of thyroid issue and we are awaiting a result of thyroid studies. Past Medical History:   Diagnosis Date    Diabetes mellitus ([address])         History reviewed. No pertinent surgical history. No Known Allergies    Social History:  Reviewed. reports that she has never smoked. She has never used smokeless tobacco. She reports that she does not drink alcohol and does not use drugs. Family History:  Reviewed. Family Hx was Reviewed. No relevant family history is present. Review of System:  All other systems reviewed and are negative except for that noted above.  Pertinent negatives are:     General: negative for fever, chills   Ophthalmic ROS: negative for - eye pain or loss of vision  ENT ROS: negative for - headaches, sore throat   Respiratory: negative for - cough, sputum  Cardiovascular: Reviewed in HPI  Gastrointestinal: negative for - abdominal pain, diarrhea, N/V  Hematology: negative for - bleeding, blood clots, bruising or sinus syndrome. She does seem to be symptomatic when she is bradycardic. I will see if the results of her thyroid function comes back and if it is abnormal and consistent with hypothyroidism, she will need treatment of hypothyroidism. Otherwise since we have not found any other identifiable cause and she remains symptomatic, and if she does remain bradycardic on telemetry tomorrow, we will proceed with a pacemaker. I discussed this with her family and herself using an .      -Abdominal pain    Resolved. CT of the abdomen was normal.    -Hyperlipidemia    Continue statin. I independently reviewed and interpreted ECG, cardiac labs, other relevant labs,  echo   . Unless otherwise reflected in the note, my interpretation is in agreement with the report. Patient at high risk of decompensation due to severe symptomatic bradycardia. Thank you for allowing me to participate in the care of Yarinisha Gee     NOTE: This report was transcribed using voice recognition software. Every effort was made to ensure accuracy, however, inadvertent computerized transcription errors may be present.

## 2023-09-26 NOTE — PROGRESS NOTES
Pharmacy Home Medication Reconciliation Note    A medication reconciliation has been completed for Rose Riddle 1950    Pharmacy: [facility], [address] [place] [address] Sumanth Eaton  Information provided by: patient via     The patient's home medication list is as follows: No current facility-administered medications on file prior to encounter. Current Outpatient Medications on File Prior to Encounter   Medication Sig Dispense Refill    linagliptin (TRADJENTA) 5 MG tablet Take 1 tablet by mouth daily      loratadine (CLARITIN) 10 MG tablet Take 1 tablet by mouth daily      valsartan (DIOVAN) 80 MG tablet Take 1 tablet by mouth daily      metFORMIN (GLUCOPHAGE) 1000 MG tablet Take 1 tablet by mouth 2 times daily (with meals)      lisinopril (PRINIVIL;ZESTRIL) 20 MG tablet Take 20 mg by mouth daily (Patient not taking: Reported on 9/25/2023)      omeprazole (PRILOSEC) 20 MG delayed release capsule Take 1 capsule by mouth daily      atorvastatin (LIPITOR) 40 MG tablet Take 1 tablet by mouth daily      simvastatin (ZOCOR) 10 MG tablet Take 10 mg by mouth nightly. (Patient not taking: Reported on 9/25/2023)      metformin (GLUCOPHAGE) 500 MG tablet Take 500 mg by mouth daily (with breakfast). (Patient not taking: Reported on 9/25/2023)      calcium carbonate (OSCAL) 500 MG TABS tablet Take 1 tablet by mouth daily         Patient is no longer taking lisinopril or simvastatin. Of note, patient states she took AM meds today except Metformin and Tradjenta. Timing of last doses updated. Thank you,  Curtis Cooper Second

## 2023-09-26 NOTE — CONSULTS
Session ID: [id number]  Language: [facility]   ID: #[id number]   Name: Petros Escoto

## 2023-09-27 ENCOUNTER — APPOINTMENT (OUTPATIENT)
Dept: GENERAL RADIOLOGY | Age: 73
End: 2023-09-27
Payer: MEDICAID

## 2023-09-27 PROBLEM — Z95.0 PACEMAKER: Status: ACTIVE | Noted: 2023-09-27

## 2023-09-27 LAB
ALBUMIN SERPL-MCNC: 3.7 G/DL (ref 3.4–5)
ANION GAP SERPL CALCULATED.3IONS-SCNC: 7 MMOL/L (ref 3–16)
BASOPHILS # BLD: 0 K/UL (ref 0–0.2)
BASOPHILS NFR BLD: 0.3 %
BUN SERPL-MCNC: 6 MG/DL (ref 7–20)
CALCIUM SERPL-MCNC: 8.8 MG/DL (ref 8.3–10.6)
CHLORIDE SERPL-SCNC: 106 MMOL/L (ref 99–110)
CO2 SERPL-SCNC: 26 MMOL/L (ref 21–32)
CREAT SERPL-MCNC: 0.7 MG/DL (ref 0.6–1.2)
DEPRECATED RDW RBC AUTO: 17.3 % (ref 12.4–15.4)
EOSINOPHIL # BLD: 0.1 K/UL (ref 0–0.6)
EOSINOPHIL NFR BLD: 1.4 %
GFR SERPLBLD CREATININE-BSD FMLA CKD-EPI: >60 ML/MIN/{1.73_M2}
GLUCOSE BLD-MCNC: 124 MG/DL (ref 70–99)
GLUCOSE BLD-MCNC: 127 MG/DL (ref 70–99)
GLUCOSE BLD-MCNC: 147 MG/DL (ref 70–99)
GLUCOSE BLD-MCNC: 257 MG/DL (ref 70–99)
GLUCOSE BLD-MCNC: 312 MG/DL (ref 70–99)
GLUCOSE SERPL-MCNC: 132 MG/DL (ref 70–99)
HCT VFR BLD AUTO: 28.6 % (ref 36–48)
HGB BLD-MCNC: 9.2 G/DL (ref 12–16)
LYMPHOCYTES # BLD: 1.4 K/UL (ref 1–5.1)
LYMPHOCYTES NFR BLD: 28.2 %
MCH RBC QN AUTO: 24.1 PG (ref 26–34)
MCHC RBC AUTO-ENTMCNC: 32.1 G/DL (ref 31–36)
MCV RBC AUTO: 75.1 FL (ref 80–100)
MONOCYTES # BLD: 0.6 K/UL (ref 0–1.3)
MONOCYTES NFR BLD: 13.1 %
NEUTROPHILS # BLD: 2.8 K/UL (ref 1.7–7.7)
NEUTROPHILS NFR BLD: 57 %
PERFORMED ON: ABNORMAL
PHOSPHATE SERPL-MCNC: 5.1 MG/DL (ref 2.5–4.9)
PLATELET # BLD AUTO: 138 K/UL (ref 135–450)
PMV BLD AUTO: 9 FL (ref 5–10.5)
POTASSIUM SERPL-SCNC: 4.5 MMOL/L (ref 3.5–5.1)
RBC # BLD AUTO: 3.81 M/UL (ref 4–5.2)
SODIUM SERPL-SCNC: 139 MMOL/L (ref 136–145)
WBC # BLD AUTO: 4.9 K/UL (ref 4–11)

## 2023-09-27 PROCEDURE — C1889 IMPLANT/INSERT DEVICE, NOC: HCPCS

## 2023-09-27 PROCEDURE — 80069 RENAL FUNCTION PANEL: CPT

## 2023-09-27 PROCEDURE — 6360000002 HC RX W HCPCS: Performed by: INTERNAL MEDICINE

## 2023-09-27 PROCEDURE — 2580000003 HC RX 258

## 2023-09-27 PROCEDURE — 0JH606Z INSERTION OF PACEMAKER, DUAL CHAMBER INTO CHEST SUBCUTANEOUS TISSUE AND FASCIA, OPEN APPROACH: ICD-10-PCS | Performed by: INTERNAL MEDICINE

## 2023-09-27 PROCEDURE — 3E0132A INTRODUCTION OF ANTI-INFECTIVE ENVELOPE INTO SUBCUTANEOUS TISSUE, PERCUTANEOUS APPROACH: ICD-10-PCS | Performed by: INTERNAL MEDICINE

## 2023-09-27 PROCEDURE — 6370000000 HC RX 637 (ALT 250 FOR IP): Performed by: STUDENT IN AN ORGANIZED HEALTH CARE EDUCATION/TRAINING PROGRAM

## 2023-09-27 PROCEDURE — 36415 COLL VENOUS BLD VENIPUNCTURE: CPT

## 2023-09-27 PROCEDURE — 2580000003 HC RX 258: Performed by: STUDENT IN AN ORGANIZED HEALTH CARE EDUCATION/TRAINING PROGRAM

## 2023-09-27 PROCEDURE — 94760 N-INVAS EAR/PLS OXIMETRY 1: CPT

## 2023-09-27 PROCEDURE — C1769 GUIDE WIRE: HCPCS

## 2023-09-27 PROCEDURE — 6360000002 HC RX W HCPCS

## 2023-09-27 PROCEDURE — 99153 MOD SED SAME PHYS/QHP EA: CPT

## 2023-09-27 PROCEDURE — C1898 LEAD, PMKR, OTHER THAN TRANS: HCPCS

## 2023-09-27 PROCEDURE — 6370000000 HC RX 637 (ALT 250 FOR IP): Performed by: NURSE PRACTITIONER

## 2023-09-27 PROCEDURE — 1200000000 HC SEMI PRIVATE

## 2023-09-27 PROCEDURE — C1887 CATHETER, GUIDING: HCPCS

## 2023-09-27 PROCEDURE — 85025 COMPLETE CBC W/AUTO DIFF WBC: CPT

## 2023-09-27 PROCEDURE — 99152 MOD SED SAME PHYS/QHP 5/>YRS: CPT

## 2023-09-27 PROCEDURE — 2580000003 HC RX 258: Performed by: INTERNAL MEDICINE

## 2023-09-27 PROCEDURE — C1892 INTRO/SHEATH,FIXED,PEEL-AWAY: HCPCS

## 2023-09-27 PROCEDURE — 02H63JZ INSERTION OF PACEMAKER LEAD INTO RIGHT ATRIUM, PERCUTANEOUS APPROACH: ICD-10-PCS | Performed by: INTERNAL MEDICINE

## 2023-09-27 PROCEDURE — 2500000003 HC RX 250 WO HCPCS

## 2023-09-27 PROCEDURE — 71045 X-RAY EXAM CHEST 1 VIEW: CPT

## 2023-09-27 PROCEDURE — 33208 INSRT HEART PM ATRIAL & VENT: CPT | Performed by: INTERNAL MEDICINE

## 2023-09-27 PROCEDURE — 99152 MOD SED SAME PHYS/QHP 5/>YRS: CPT | Performed by: INTERNAL MEDICINE

## 2023-09-27 PROCEDURE — 33208 INSRT HEART PM ATRIAL & VENT: CPT

## 2023-09-27 PROCEDURE — 6370000000 HC RX 637 (ALT 250 FOR IP): Performed by: INTERNAL MEDICINE

## 2023-09-27 PROCEDURE — 02HK3JZ INSERTION OF PACEMAKER LEAD INTO RIGHT VENTRICLE, PERCUTANEOUS APPROACH: ICD-10-PCS | Performed by: INTERNAL MEDICINE

## 2023-09-27 PROCEDURE — C1785 PMKR, DUAL, RATE-RESP: HCPCS

## 2023-09-27 RX ORDER — SODIUM CHLORIDE 0.9 % (FLUSH) 0.9 %
5-40 SYRINGE (ML) INJECTION PRN
Status: DISCONTINUED | OUTPATIENT
Start: 2023-09-27 | End: 2023-09-28 | Stop reason: HOSPADM

## 2023-09-27 RX ORDER — OXYCODONE HYDROCHLORIDE 5 MG/1
10 TABLET ORAL EVERY 4 HOURS PRN
Status: DISCONTINUED | OUTPATIENT
Start: 2023-09-27 | End: 2023-09-28 | Stop reason: HOSPADM

## 2023-09-27 RX ORDER — OXYCODONE HYDROCHLORIDE 5 MG/1
5 TABLET ORAL EVERY 4 HOURS PRN
Status: DISCONTINUED | OUTPATIENT
Start: 2023-09-27 | End: 2023-09-28 | Stop reason: HOSPADM

## 2023-09-27 RX ORDER — SODIUM CHLORIDE 0.9 % (FLUSH) 0.9 %
5-40 SYRINGE (ML) INJECTION EVERY 12 HOURS SCHEDULED
Status: DISCONTINUED | OUTPATIENT
Start: 2023-09-27 | End: 2023-09-28 | Stop reason: HOSPADM

## 2023-09-27 RX ORDER — SODIUM CHLORIDE 9 MG/ML
INJECTION, SOLUTION INTRAVENOUS PRN
Status: DISCONTINUED | OUTPATIENT
Start: 2023-09-27 | End: 2023-09-28 | Stop reason: HOSPADM

## 2023-09-27 RX ORDER — CHLORHEXIDINE GLUCONATE 4 G/100ML
SOLUTION TOPICAL ONCE
Status: COMPLETED | OUTPATIENT
Start: 2023-09-27 | End: 2023-09-27

## 2023-09-27 RX ORDER — ENOXAPARIN SODIUM 100 MG/ML
40 INJECTION SUBCUTANEOUS DAILY
Status: DISCONTINUED | OUTPATIENT
Start: 2023-09-27 | End: 2023-09-28 | Stop reason: HOSPADM

## 2023-09-27 RX ADMIN — SODIUM CHLORIDE, PRESERVATIVE FREE 10 ML: 5 INJECTION INTRAVENOUS at 20:12

## 2023-09-27 RX ADMIN — CEFAZOLIN 2000 MG: 2 INJECTION, POWDER, FOR SOLUTION INTRAMUSCULAR; INTRAVENOUS at 13:12

## 2023-09-27 RX ADMIN — CHLORHEXIDINE GLUCONATE: 213 SOLUTION TOPICAL at 13:13

## 2023-09-27 RX ADMIN — PANTOPRAZOLE SODIUM 40 MG: 40 TABLET, DELAYED RELEASE ORAL at 05:20

## 2023-09-27 RX ADMIN — OXYCODONE HYDROCHLORIDE 5 MG: 5 TABLET ORAL at 20:12

## 2023-09-27 RX ADMIN — SODIUM CHLORIDE, PRESERVATIVE FREE 10 ML: 5 INJECTION INTRAVENOUS at 08:23

## 2023-09-27 ASSESSMENT — PAIN SCALES - GENERAL
PAINLEVEL_OUTOF10: 5
PAINLEVEL_OUTOF10: 0

## 2023-09-27 ASSESSMENT — PAIN DESCRIPTION - LOCATION: LOCATION: CHEST

## 2023-09-27 ASSESSMENT — PAIN DESCRIPTION - ORIENTATION: ORIENTATION: LEFT

## 2023-09-27 ASSESSMENT — PAIN DESCRIPTION - PAIN TYPE: TYPE: SURGICAL PAIN

## 2023-09-27 ASSESSMENT — PAIN - FUNCTIONAL ASSESSMENT: PAIN_FUNCTIONAL_ASSESSMENT: PREVENTS OR INTERFERES SOME ACTIVE ACTIVITIES AND ADLS

## 2023-09-27 NOTE — DISCHARGE INSTRUCTIONS
Post-Device Implant     1. Wound Care    -Bathe daily but keep incision dry and clean until your 7-10 day follow up    -Do not shower until you are told to do so by your physician.    -Do not remove the outer dressing unless it is soiled    -Allow the thin pieces of tape (Steri-Strips) to fall off naturally. Do not pick or pull at these unless instructed to do so by your physician. 2. Contact the cardiologists office for these concerns:    -Increased swelling and/or tenderness in incision and/or extending down the arm on the same side as implant site    -Feeling increased palpitations or irregular heart beat    -Redness of incision or drainage from incision.    -Bleeding that does not stop or increases.    -Skin pimples along incision.    -If a suture works its way through the incision.    -Fever greater than 100.5     3. Do not rub or twist the device site     4. Avoid lifting objects heavier than 10 pounds for one month. Do not raise the arm on the side where the device was implanted over your head for one month. These rules help to heal the implant site and stabilize the heart lead wires. 5. Avoid tight clothing over the incision. 6. No driving for one week or until initial visit after your procedure. 7. Carry your temporary Device  I.D. Card with you until your permanent card arrives in four to six weeks. An I. D. Card should be with you always. 8. If you have a defibrillator (AICD) and receive a shock or hear a tone from the device call the doctor's office     9. An implanted device does not replace any medications, diet or activity restrictions that you had before the implant. Check with your cardiologist regarding questions     10. Reasons to seek medical attention immediately: Call 911                -Sudden onset of chest pain, shortness of breath, dizziness, or loss of balance or coordination                -Sudden Change in vision                -Sudden confusion or trouble speaking

## 2023-09-27 NOTE — PRE SEDATION
Sedation Pre-Procedure Note    Patient Name: Luli Barrett   YOB: 1950  Room/Bed: V6D-2036/4940-68  Medical Record Number: [id number]  Date: 9/27/2023   Time: 1:07 PM       Indication:  bradycardia    Consent: I have discussed with the patient and/or the patient representative the indication, alternatives, and the possible risks and/or complications of the planned procedure and the anesthesia methods. The patient and/or patient representative appear to understand and agree to proceed. Vital Signs:   Vitals:    09/27/23 1045   BP:    Pulse: (!) 47   Resp:    Temp:    SpO2:        Past Medical History:   has a past medical history of Diabetes mellitus ([address] Past Surgical History:   has no past surgical history on file. Medications:   Scheduled Meds:    sodium chloride flush  5-40 mL IntraVENous 2 times per day    chlorhexidine   Topical Once    ceFAZolin (ANCEF) IVPB  2,000 mg IntraVENous On Call to OR    insulin lispro  0-8 Units SubCUTAneous Q6H    enoxaparin  30 mg SubCUTAneous Daily    atorvastatin  10 mg Oral Daily    pantoprazole  40 mg Oral QAM AC    sodium chloride flush  5-40 mL IntraVENous 2 times per day     Continuous Infusions:    sodium chloride      sodium chloride      dextrose       PRN Meds: sodium chloride flush, sodium chloride, perflutren lipid microspheres, sodium chloride flush, sodium chloride, ondansetron **OR** ondansetron, polyethylene glycol, acetaminophen **OR** acetaminophen, dextrose bolus **OR** dextrose bolus, glucagon (rDNA), dextrose  Home Meds:   Prior to Admission medications    Medication Sig Start Date End Date Taking?  Authorizing Provider   linagliptin (TRADJENTA) 5 MG tablet Take 1 tablet by mouth daily   Yes Historical Provider, MD   loratadine (CLARITIN) 10 MG tablet Take 1 tablet by mouth daily   Yes Historical Provider, MD   valsartan (DIOVAN) 80 MG tablet Take 1 tablet by mouth daily   Yes Historical Provider, MD   metFORMIN (GLUCOPHAGE) 1000 MG tablet Take 1 tablet by mouth 2 times daily (with meals)   Yes Historical Provider, MD   omeprazole (PRILOSEC) 20 MG delayed release capsule Take 1 capsule by mouth daily    Historical Provider, MD   atorvastatin (LIPITOR) 40 MG tablet Take 1 tablet by mouth daily    Historical Provider, MD   metformin (GLUCOPHAGE) 500 MG tablet Take 500 mg by mouth daily (with breakfast). Patient not taking: Reported on 9/25/2023    Historical Provider, MD   calcium carbonate (OSCAL) 500 MG TABS tablet Take 1 tablet by mouth daily    Historical Provider, MD     Coumadin Use Last 7 Days:  no  Antiplatelet drug therapy use last 7 days: no  Other anticoagulant use last 7 days: no  Additional Medication Information:        Pre-Sedation Documentation and Exam:   I have personally completed a history, physical exam & review of systems for this patient (see notes).     Mallampati Airway Assessment:  Mallampati Class II - (soft palate, fauces & uvula are visible)    Prior History of Anesthesia Complications:   none    ASA Classification:  Class 2 - A normal healthy patient with mild systemic disease    Sedation/ Anesthesia Plan:   intravenous sedation    Medications Planned:   midazolam (Versed) intravenously and fentanyl intravenously    Patient is an appropriate candidate for plan of sedation: yes    Electronically signed by Ladan Cotneh MD on 9/27/2023 at 1:07 PM

## 2023-09-27 NOTE — PROGRESS NOTES
Nutrition Note    RECOMMENDATIONS  PO Diet: Resume diet per MD. Recommend 4 carb choice modifier given hx of DM. Pt does not consume meat  ONS: If po intake of meals is consistently <50%, please order Glucerna    NUTRITION ASSESSMENT   Pt triggered for positive nutrition screen indicating wt loss and poor po intake. C/o abdominal pain, N/V, diarrhea that started morning of admission. Upon visiting, pt reported unsure of amount of wt loss or UBW; no prior wt hx in EMR to assess for any recent wt changes. Reported over the last 2 to 3 years, appetite and po intake fluctuates. Currently NPO for pacemaker placement today. RD will continue to monitor for adequate po intake vs need for ONS. Nutrition Related Findings: Lytes obtained today WNL. No BM documented, GI WDL. No edema noted. Wounds: None  Nutrition Education:  Education not indicated   Nutrition Goals: by next RD assessment, PO intake 50% or greater     MALNUTRITION ASSESSMENT   Chronic Illness  Malnutrition Status: Insufficient data    NUTRITION DIAGNOSIS   Inadequate oral intake related to inadequate protein-energy intake, altered GI function, pain as evidenced by poor intake prior to admission, nausea, vomiting, diarrhea    CURRENT NUTRITION THERAPIES  Diet NPO     PO Intake: NPO   PO Supplement Intake:NPO    ANTHROPOMETRICS  Current Height: 5' 2\" (157.5 cm)  Current Weight - Scale: 114 lb 1.6 oz (51.8 kg)    Admission weight: 110 lb (49.9 kg)  Ideal Body Weight (IBW): 110 lbs  (50 kg)    Usual Bodyweight  (pt unsure)       BMI: 20.8    COMPARATIVE STANDARDS  Total Energy Requirements (kcals/day): 2384-6205     Protein (g):         Fluid (mL/day):  1965-9113    The patient will be monitored per nutrition standards of care. Consult dietitian if additional nutrition interventions are needed prior to RD reassessment.      Shona Renner, MS, RD, LD    Contact: [phone]

## 2023-09-27 NOTE — PROCEDURES
were reported as correct at the end of the procedure. The patient tolerated the procedure well and there were no complications. Post-sedation evaluation was completed. Patient was transported to the holding area in stable condition. Blood ZUOQ<50 cc  No complication  Lead and device information:         Plan:     The patient will be admitted  and have usual post-implant care, including chest x-ray,  and interrogation of the device.

## 2023-09-27 NOTE — PROGRESS NOTES
Hospitalist Progress Note      Name:  Tarik Duke /Age/Sex: 1950  (67 y.o. female)   MRN & CSN:  [id number] & [id number] Encounter Date/Time: 2023 9:38 AM EDT   Location:  [id number]-2368/5623-69 PCP: Dora PRATHER [place] [address] Day: 3    Subjective:   Chief Complaint:   Chief Complaint   Patient presents with    Emesis    Abdominal Pain     Used Luxembourgish - c/o abd pain and vomiting since this morning x 4 episodes. Denies fevers. Tarik Duke is a 67 y.o. female with a past medical history of hypertension, hyperlipidemia, DM2 who presented with presented with abdominal pain and nausea, vomiting  tp the ER on . She was noted to be bradycardic in the ER. She received 0.5 mg of atropine and felt better afterwards. She was started on dopamine overnight. This morning her heart rate was in 50s on dopamine. We will stop the dopamine and her heart rate later during the day went down between 37 and 45. We tried to do a stress test but she was too weak to do it. Her heart rate increased to 90 but then dropped back to 30 and she became symptomatic and dizzy. Thyroid stable. CT AP was nonacute     S/p dual chamber PPM 2023      Interval History: Today, resting in bed today. She is drowsy from surgery. Denies pain. No CP or SOB. Denies N/V/D or abd pain. No new symptoms today, no acute events overnight. Independently reviewed interval ancillary notes from cardiology. Assessment and Recommendations   Problem List  Principal Problem:    Symptomatic bradycardia  Active Problems:    Type 2 diabetes mellitus with hyperglycemia, without long-term current use of insulin ([address])    Hypertension associated with diabetes ([address])    Hyperlipidemia associated with type 2 diabetes mellitus ([address])    Acute abdominal pain  Resolved Problems:    * No resolved hospital problems.  *     Assessment and Plan:    N/V/abdominal pain  -CT abd/pel showed place  HEENT: Symmetric and normocephalic. Conjunctiva pink with clear sclera. Mucus membranes pink and moist.   Cardiovascular: regular rate and rhythm. S1 & S2, negative for murmurs. Peripheral pulses 2+, capillary refill < 3 seconds. No peripheral edema  Respiratory: Respirations symmetric and unlabored. Lungs clear to auscultation bilaterally, no wheezing, crackles, or rhonchi  Gastrointestinal: Abdomen soft and round. Bowel sounds normoactive in all quadrants. Musculoskeletal: No focal weakness. Neurologic/Psych: Awake and orientated to person, place and time. Calm affect, appropriate mood    Medications:   Medications:    insulin lispro  0-8 Units SubCUTAneous Q6H    enoxaparin  30 mg SubCUTAneous Daily    atorvastatin  10 mg Oral Daily    pantoprazole  40 mg Oral QAM AC    sodium chloride flush  5-40 mL IntraVENous 2 times per day      Infusions:    sodium chloride      dextrose       PRN Meds: perflutren lipid microspheres, 1.5 mL, ONCE PRN  sodium chloride flush, 5-40 mL, PRN  sodium chloride, , PRN  ondansetron, 4 mg, Q8H PRN   Or  ondansetron, 4 mg, Q6H PRN  polyethylene glycol, 17 g, Daily PRN  acetaminophen, 650 mg, Q6H PRN   Or  acetaminophen, 650 mg, Q6H PRN  dextrose bolus, 125 mL, PRN   Or  dextrose bolus, 250 mL, PRN  glucagon (rDNA), 1 mg, PRN  dextrose, , Continuous PRN        Labs and Imaging   Results this Admission:  Echo: 9/26/2023   Summary   Normal left ventricle size, wall thickness, and systolic function with an   estimated ejection fraction of 55-60%. No regional wall motion abnormalities   are seen. Normal diastolic function. The right ventricle is normal in size and function. Trivial tricuspid regurgitation with an RVSP of 26 mmHg. CXR 9/27/2023  IMPRESSION:  Interval placement of dual lead left subclavian cardiac pacemaker. No  postprocedural pneumothorax. Mild interstitial edema   CT AP: 9/27/2023  IMPRESSION:  No acute abdominopelvic process.   Additional nonemergent

## 2023-09-27 NOTE — PROGRESS NOTES
[facility]   Electrophysiology Progress Note     Date: 9/27/2023  Admit Date: 9/25/2023     Reason for consultation: Bradycardia    Chief Complaint:   Chief Complaint   Patient presents with    Emesis    Abdominal Pain     Used Lithuanian - c/o abd pain and vomiting since this morning x 4 episodes. Denies fevers. History of Present Illness: History obtained from patient and medical record. Crow Clifford is a 67 y.o. female with a past medical history of HTN, HLD and DM. She presented with abdominal pain and nausea, vomiting  tp the ER on 9/25. She was noted to be bradycardic in the ER. She received 0.5 mg of atropine and felt better afterwards. She was started on dopamine overnight. This morning her heart rate was in 50s on dopamine. We will stop the dopamine and her heart rate later during the day went down between 37 and 45. We tried to do a stress test but she was too weak to do it. Her heart rate increased to 90 but then dropped back to 30 and she became symptomatic and dizzy. She has history of thyroid issue and we are awaiting a result of thyroid studies. Interval Hx: Today, she is resting in bed with her family at the bedside. She is Lithuanian speaking, her daughter helps to interpret during the visit. She denies chest pain, palpitations or shortness of breath. She does complain of dizziness at times while lying in bed. We discussed pre and some post pacemaker insertion instructions. They have no further questions at this time. She was seen and examined. Clinical notes reviewed. Telemetry reviewed. No new complaints today. No major events overnight.      Assessment and Plan:     Symptomatic Sinus Bradycardia  - telemetry shows sinus bradycardia rates 40-50s  - symptomatic dizziness, BP stable  - 9/26 ECHO- EF- 55%  - unable to compete stress testing, no previous ischemic workup  - Troponin- 12, 11  - TSH- 0.74  - Cortisol am level- 15.2  - plan for PPM

## 2023-09-27 NOTE — PLAN OF CARE
Problem: Discharge Planning  Goal: Discharge to home or other facility with appropriate resources  Outcome: Progressing     Problem: Safety - Adult  Goal: Free from fall injury  Outcome: Progressing     Problem: Chronic Conditions and Co-morbidities  Goal: Patient's chronic conditions and co-morbidity symptoms are monitored and maintained or improved  Outcome: Progressing     Problem: Nutrition Deficit:  Goal: Optimize nutritional status  Outcome: Progressing     Problem: Cardiovascular - Adult  Goal: Maintains optimal cardiac output and hemodynamic stability  Outcome: Progressing     Problem: Musculoskeletal - Adult  Goal: Return mobility to safest level of function  Outcome: Progressing     Problem: Metabolic/Fluid and Electrolytes - Adult  Goal: Electrolytes maintained within normal limits  Outcome: Progressing

## 2023-09-28 ENCOUNTER — NURSE ONLY (OUTPATIENT)
Dept: CARDIOLOGY CLINIC | Age: 73
End: 2023-09-28
Payer: MEDICAID

## 2023-09-28 VITALS
HEIGHT: 62 IN | BODY MASS INDEX: 22.19 KG/M2 | DIASTOLIC BLOOD PRESSURE: 86 MMHG | OXYGEN SATURATION: 96 % | WEIGHT: 120.6 LBS | TEMPERATURE: 97.6 F | RESPIRATION RATE: 18 BRPM | HEART RATE: 60 BPM | SYSTOLIC BLOOD PRESSURE: 145 MMHG

## 2023-09-28 DIAGNOSIS — R00.1 SYMPTOMATIC BRADYCARDIA: ICD-10-CM

## 2023-09-28 DIAGNOSIS — Z95.0 PACEMAKER: Primary | ICD-10-CM

## 2023-09-28 LAB
ALBUMIN SERPL-MCNC: 3.6 G/DL (ref 3.4–5)
ANION GAP SERPL CALCULATED.3IONS-SCNC: 9 MMOL/L (ref 3–16)
BASOPHILS # BLD: 0 K/UL (ref 0–0.2)
BASOPHILS NFR BLD: 0.2 %
BUN SERPL-MCNC: 5 MG/DL (ref 7–20)
CALCIUM SERPL-MCNC: 8.9 MG/DL (ref 8.3–10.6)
CHLORIDE SERPL-SCNC: 104 MMOL/L (ref 99–110)
CO2 SERPL-SCNC: 24 MMOL/L (ref 21–32)
CREAT SERPL-MCNC: 0.8 MG/DL (ref 0.6–1.2)
DEPRECATED RDW RBC AUTO: 17.2 % (ref 12.4–15.4)
EOSINOPHIL # BLD: 0.1 K/UL (ref 0–0.6)
EOSINOPHIL NFR BLD: 1.7 %
FERRITIN SERPL IA-MCNC: 17.3 NG/ML (ref 15–150)
GFR SERPLBLD CREATININE-BSD FMLA CKD-EPI: >60 ML/MIN/{1.73_M2}
GLUCOSE BLD-MCNC: 121 MG/DL (ref 70–99)
GLUCOSE BLD-MCNC: 138 MG/DL (ref 70–99)
GLUCOSE BLD-MCNC: 246 MG/DL (ref 70–99)
GLUCOSE SERPL-MCNC: 176 MG/DL (ref 70–99)
HCT VFR BLD AUTO: 28.4 % (ref 36–48)
HGB BLD-MCNC: 9.4 G/DL (ref 12–16)
IRON SATN MFR SERPL: 7 % (ref 15–50)
IRON SERPL-MCNC: 24 UG/DL (ref 37–145)
LYMPHOCYTES # BLD: 1.3 K/UL (ref 1–5.1)
LYMPHOCYTES NFR BLD: 21.7 %
MCH RBC QN AUTO: 24.6 PG (ref 26–34)
MCHC RBC AUTO-ENTMCNC: 32.9 G/DL (ref 31–36)
MCV RBC AUTO: 74.7 FL (ref 80–100)
MONOCYTES # BLD: 0.5 K/UL (ref 0–1.3)
MONOCYTES NFR BLD: 8.1 %
NEUTROPHILS # BLD: 4.2 K/UL (ref 1.7–7.7)
NEUTROPHILS NFR BLD: 68.3 %
PERFORMED ON: ABNORMAL
PHOSPHATE SERPL-MCNC: 4.9 MG/DL (ref 2.5–4.9)
PLATELET # BLD AUTO: 141 K/UL (ref 135–450)
PMV BLD AUTO: 9 FL (ref 5–10.5)
POTASSIUM SERPL-SCNC: 4.4 MMOL/L (ref 3.5–5.1)
RBC # BLD AUTO: 3.81 M/UL (ref 4–5.2)
SODIUM SERPL-SCNC: 137 MMOL/L (ref 136–145)
TIBC SERPL-MCNC: 321 UG/DL (ref 260–445)
WBC # BLD AUTO: 6.1 K/UL (ref 4–11)

## 2023-09-28 PROCEDURE — 6360000002 HC RX W HCPCS: Performed by: INTERNAL MEDICINE

## 2023-09-28 PROCEDURE — 97165 OT EVAL LOW COMPLEX 30 MIN: CPT

## 2023-09-28 PROCEDURE — 85025 COMPLETE CBC W/AUTO DIFF WBC: CPT

## 2023-09-28 PROCEDURE — 6370000000 HC RX 637 (ALT 250 FOR IP): Performed by: INTERNAL MEDICINE

## 2023-09-28 PROCEDURE — 83550 IRON BINDING TEST: CPT

## 2023-09-28 PROCEDURE — 97530 THERAPEUTIC ACTIVITIES: CPT

## 2023-09-28 PROCEDURE — 93280 PM DEVICE PROGR EVAL DUAL: CPT | Performed by: INTERNAL MEDICINE

## 2023-09-28 PROCEDURE — 97535 SELF CARE MNGMENT TRAINING: CPT

## 2023-09-28 PROCEDURE — 36415 COLL VENOUS BLD VENIPUNCTURE: CPT

## 2023-09-28 PROCEDURE — 2580000003 HC RX 258: Performed by: INTERNAL MEDICINE

## 2023-09-28 PROCEDURE — 97116 GAIT TRAINING THERAPY: CPT

## 2023-09-28 PROCEDURE — 82728 ASSAY OF FERRITIN: CPT

## 2023-09-28 PROCEDURE — 6370000000 HC RX 637 (ALT 250 FOR IP): Performed by: NURSE PRACTITIONER

## 2023-09-28 PROCEDURE — 97162 PT EVAL MOD COMPLEX 30 MIN: CPT

## 2023-09-28 PROCEDURE — 83540 ASSAY OF IRON: CPT

## 2023-09-28 PROCEDURE — 6370000000 HC RX 637 (ALT 250 FOR IP): Performed by: STUDENT IN AN ORGANIZED HEALTH CARE EDUCATION/TRAINING PROGRAM

## 2023-09-28 PROCEDURE — 80069 RENAL FUNCTION PANEL: CPT

## 2023-09-28 RX ORDER — VALSARTAN 40 MG/1
80 TABLET ORAL DAILY
Status: DISCONTINUED | OUTPATIENT
Start: 2023-09-28 | End: 2023-09-28 | Stop reason: HOSPADM

## 2023-09-28 RX ORDER — GLUCAGON 1 MG/ML
1 KIT INJECTION PRN
Status: DISCONTINUED | OUTPATIENT
Start: 2023-09-28 | End: 2023-09-28 | Stop reason: HOSPADM

## 2023-09-28 RX ORDER — DEXTROSE MONOHYDRATE 100 MG/ML
INJECTION, SOLUTION INTRAVENOUS CONTINUOUS PRN
Status: DISCONTINUED | OUTPATIENT
Start: 2023-09-28 | End: 2023-09-28 | Stop reason: HOSPADM

## 2023-09-28 RX ORDER — INSULIN LISPRO 100 [IU]/ML
0-4 INJECTION, SOLUTION INTRAVENOUS; SUBCUTANEOUS NIGHTLY
Status: DISCONTINUED | OUTPATIENT
Start: 2023-09-28 | End: 2023-09-28 | Stop reason: HOSPADM

## 2023-09-28 RX ORDER — INSULIN LISPRO 100 [IU]/ML
0-8 INJECTION, SOLUTION INTRAVENOUS; SUBCUTANEOUS
Status: DISCONTINUED | OUTPATIENT
Start: 2023-09-28 | End: 2023-09-28 | Stop reason: HOSPADM

## 2023-09-28 RX ADMIN — INSULIN LISPRO 2 UNITS: 100 INJECTION, SOLUTION INTRAVENOUS; SUBCUTANEOUS at 11:49

## 2023-09-28 RX ADMIN — SODIUM CHLORIDE, PRESERVATIVE FREE 10 ML: 5 INJECTION INTRAVENOUS at 07:52

## 2023-09-28 RX ADMIN — VALSARTAN 80 MG: 40 TABLET, FILM COATED ORAL at 12:00

## 2023-09-28 RX ADMIN — ATORVASTATIN CALCIUM 10 MG: 10 TABLET, FILM COATED ORAL at 07:52

## 2023-09-28 RX ADMIN — PANTOPRAZOLE SODIUM 40 MG: 40 TABLET, DELAYED RELEASE ORAL at 06:30

## 2023-09-28 RX ADMIN — ACETAMINOPHEN 650 MG: 325 TABLET ORAL at 11:49

## 2023-09-28 RX ADMIN — ENOXAPARIN SODIUM 40 MG: 40 INJECTION SUBCUTANEOUS at 07:52

## 2023-09-28 RX ADMIN — OXYCODONE HYDROCHLORIDE 5 MG: 5 TABLET ORAL at 01:13

## 2023-09-28 ASSESSMENT — PAIN SCALES - GENERAL
PAINLEVEL_OUTOF10: 3
PAINLEVEL_OUTOF10: 0
PAINLEVEL_OUTOF10: 6

## 2023-09-28 ASSESSMENT — PAIN DESCRIPTION - ONSET: ONSET: ON-GOING

## 2023-09-28 ASSESSMENT — PAIN DESCRIPTION - LOCATION
LOCATION: CHEST
LOCATION: CHEST

## 2023-09-28 ASSESSMENT — PAIN DESCRIPTION - PAIN TYPE: TYPE: ACUTE PAIN;SURGICAL PAIN

## 2023-09-28 ASSESSMENT — PAIN DESCRIPTION - ORIENTATION
ORIENTATION: LEFT
ORIENTATION: RIGHT

## 2023-09-28 ASSESSMENT — PAIN - FUNCTIONAL ASSESSMENT: PAIN_FUNCTIONAL_ASSESSMENT: ACTIVITIES ARE NOT PREVENTED

## 2023-09-28 ASSESSMENT — PAIN DESCRIPTION - FREQUENCY: FREQUENCY: INTERMITTENT

## 2023-09-28 ASSESSMENT — PAIN DESCRIPTION - DESCRIPTORS: DESCRIPTORS: ACHING

## 2023-09-28 NOTE — PROGRESS NOTES
Required Braces/Orthotics: sling and swathe for 24 hrs   [] Right  [] Left  Positional Restrictions:Pacemaker - No shoulder flexion > 90*    Pre-Admission Information   Lives With: daughter, and son in law                          Type of Home: house  Home Layout: two level, bedroom/bathroom upstairs  Home Access: level entry  Bathroom Layout: walk in shower  Bathroom Equipment:  no equipment  Toilet Height: standard height  Home Equipment: single point cane  Transfer Assistance: modified independent with use of cane  Ambulation Assistance:modified independent with use of cane  ADL Assistance: requires assistance with bathing, requires assistance with dressing  IADL Assistance: requires assistance with all homemaking tasks  Active :        [] Yes                 [x] No  Hand Dominance: [] Left                 [] Right  Current Employment: unemployed  Hobbies:   Recent Falls: No falls, the pt's daughter works during the day. Pt was having fatigue and SOB at home. The pt's daughter will be off of work for 1 week when the pt discharges home. Examination   Vision:   Vision Gross Assessment: Impaired and Vision Corrective Device: wears glasses for reading reported blurry vision   Hearing:   WFL  Observation:   General Observation:  swathe on LUE. On RA   Posture:   Fair   Sensation:   denies numbness and tingling  Proprioception:    WFL  Tone:   Normotonic  Coordination Testing:   WFL    ROM:   RUE WFL. LUE shoulder flexion 90 degrees with increased time due to pain. L elbow, wrist and hand WFL   Strength:   RUE WFL. LUE not assessed due to pain     Therapist Clinical Decision Making (Complexity): low complexity  Clinical Presentation: evolving      Subjective  General: Patient in bed upon arrival, agreeable to OT/PT evaluation. Video  utilized for session. Reporting no concerns going home due to daughter taking off work for week and able to assist.   Pain: 1/10.   Location: L side of chest   Pain patient verbalizes understanding, would benefit from continued reinforcement    Assessment  Activity Tolerance: patient tolerated well. Limited by L chest pain. HR 60-85 bpm   Impairments Requiring Therapeutic Intervention: decreased functional mobility, decreased ADL status, decreased strength, decreased endurance, decreased balance, increased pain  Prognosis: good  Clinical Assessment: Patient presenting below baseline function secondary to pacemaker placement for bradycardia. Patient requiring CGA for mobility with HHA and limited by decreased activity tolerance and pain in LUE. Patient will benefit from initial 24 hour supervision available from abelino and Carlosletha Rolf in order to ensure safety and independence in home environment. Safety Interventions: patient left in chair, chair alarm in place, call light within reach, patient at risk for falls, and nurse notified    Plan  Frequency: 3-5 x/per week  Current Treatment Recommendations: strengthening, balance training, functional mobility training, transfer training, endurance training, patient/caregiver education, and ADL/self-care training    Goals  Patient Goals: to go home    Short Term Goals:  Time Frame: Discharge   Patient will complete upper body ADL at supervision   Patient will complete lower body ADL at supervision   Patient will complete toileting at supervision   Patient will complete functional transfers at supervision   Patient will increase [facility] ADL score = to or > than 20 /24    Above goals reviewed on 9/28/2023. All goals are ongoing at this time unless indicated above.        Therapy Session Time     Individual Group Co-treatment   Time In    6299   Time Out    0902   Minutes    40        Timed Code Treatment Minutes:  Timed Code Treatment Minutes: 25 Minutes  Total Treatment Minutes:  40 minutes        Electronically Signed By: Alicia Silva, [address] [facility], [facility] OTR/L [id number]

## 2023-09-28 NOTE — FLOWSHEET NOTE
Pt positioned in bed per comfort with assist of bessy TOUSSAINT.  Pt ate part of dinner that family left and pain medication given per request.

## 2023-09-28 NOTE — PROGRESS NOTES
[facility] Department   Phone: [phone]    Physical Therapy    [x] Initial Evaluation            [] Daily Treatment Note         [] Discharge Summary      Patient: Gi Paz   : 1950   MRN: [id number]   Date of Service:  2023  Admitting Diagnosis: Symptomatic bradycardia  Current Admission Summary: The pt was admitted with nausea and vomitting. Found to have bradycardia and had a pacemaker placed . Past Medical History:  has a past medical history of Diabetes mellitus ([address]). Past Surgical History:  has no past surgical history on file. Discharge Recommendations: Gi Paz scored a 18/24 on the AM-PAC short mobility form. Current research shows that an AM-PAC score of 18 or greater is typically associated with a discharge to the patient's home setting. Based on the patient's AM-PAC score and their current functional mobility deficits, it is recommended that the patient have 2-3 sessions per week of Physical Therapy at d/c to increase the patient's independence. At this time, this patient demonstrates the endurance and safety to discharge home with home services and a follow up treatment frequency of 2-3x/wk. Please see assessment section for further patient specific details. If patient discharges prior to next session this note will serve as a discharge summary. Please see below for the latest assessment towards goals.    HOME HEALTH CARE: LEVEL 1 STANDARD    - Initial home health evaluation to occur within 24-48 hours, in patient home   - Therapy to evaluate with goal of regaining prior level of functioning   - Therapy to evaluate if patient has [address] needs for personal care    DME Required For Discharge: patient has all required DME for discharge  Precautions/Restrictions: high fall risk, cardiac      Required Braces/Orthotics: sling and swathe for 24 hrs   [] Right  [] Left  Positional Restrictions:Pacemaker - No shoulder

## 2023-09-28 NOTE — CONSULTS
Session ID: [id number]  Language: [facility]   ID: #[id number]   Name: Lorenzo Martinez

## 2023-09-28 NOTE — CONSULTS
Session ID: [id number]  Language: [facility]   ID: #[id number]   Name: Sarah Jiang

## 2023-09-28 NOTE — DISCHARGE SUMMARY
[address]OSPITALISTS DISCHARGE SUMMARY    Patient Demographics    Patient. Omar Perla  Date of Birth. 1950  MRN. [id number]     Primary care provider. [address] [place] [address] [address]  (Tel: None)    Admit date: 9/25/2023    Discharge date (blank if same as Note Date): Note Date: 9/28/2023     Reason for Hospitalization. Chief Complaint   Patient presents with    Emesis    Abdominal Pain     Used English - c/o abd pain and vomiting since this morning x 4 episodes. Denies fevers. Significant Findings. Principal Problem:    Symptomatic bradycardia  Active Problems:    Type 2 diabetes mellitus with hyperglycemia, without long-term current use of insulin ([address])    Hypertension associated with diabetes ([address])    Mixed hyperlipidemia    Acute abdominal pain  Resolved Problems:    * No resolved hospital problems. [facility]. Omar Perla is a 67 y.o. female with a past medical history of hypertension, hyperlipidemia, DM2 who presented with presented with abdominal pain and nausea, vomiting  tp the ER on 9/25. She was noted to be bradycardic in the ER. She received 0.5 mg of atropine and felt better afterwards. She was started on dopamine overnight. This morning her heart rate was in 50s on dopamine. We will stop the dopamine and her heart rate later during the day went down between 37 and 45. We tried to do a stress test but she was too weak to do it. Her heart rate increased to 90 but then dropped back to 30 and she became symptomatic and dizzy. Thyroid stable.  CT AP was nonacute      S/p dual chamber PPM 9/27/2023      Assessment and Plan:  N/V/abdominal pain  -CT abd/pel showed no acute abdominopelvic process  - tolerating oral diet well  -Antiemetics no longer required  - Symptoms resolved last 2 days     Symptomatic bradycardia  - S/p dual chamber PPM 9/27/2023  - Discussed with cardiology and post operative CXR and device interrogation

## 2023-09-28 NOTE — PLAN OF CARE
Problem: Discharge Planning  Goal: Discharge to home or other facility with appropriate resources  9/27/2023 2136 by Ines Chambers RN  Outcome: Progressing  Flowsheets (Taken 9/27/2023 2136)  Discharge to home or other facility with appropriate resources: Identify discharge learning needs (meds, wound care, etc)     Problem: Chronic Conditions and Co-morbidities  Goal: Patient's chronic conditions and co-morbidity symptoms are monitored and maintained or improved  9/27/2023 2136 by Ines Chambers RN  Outcome: Progressing  Flowsheets (Taken 9/27/2023 2136)  Care Plan - Patient's Chronic Conditions and Co-Morbidity Symptoms are Monitored and Maintained or Improved: Monitor and assess patient's chronic conditions and comorbid symptoms for stability, deterioration, or improvement     Problem: Cardiovascular - Adult  Goal: Maintains optimal cardiac output and hemodynamic stability  9/27/2023 2136 by Ines Chambers RN  Outcome: Progressing  Flowsheets (Taken 9/27/2023 2136)  Maintains optimal cardiac output and hemodynamic stability: Monitor blood pressure and heart rate     Problem: Musculoskeletal - Adult  Goal: Return mobility to safest level of function  9/27/2023 2136 by Ines Chambers RN  Outcome: Progressing  Flowsheets (Taken 9/27/2023 2136)  Return Mobility to Safest Level of Function: Ensure adequate protection for wounds/incisions during mobilization     Problem: Metabolic/Fluid and Electrolytes - Adult  Goal: Electrolytes maintained within normal limits  9/27/2023 2136 by Ines Chambers RN  Outcome: Progressing  Flowsheets (Taken 9/27/2023 2136)  Electrolytes maintained within normal limits: Monitor labs and assess patient for signs and symptoms of electrolyte imbalances     Problem: Pain  Goal: Verbalizes/displays adequate comfort level or baseline comfort level  Outcome: Progressing  Flowsheets (Taken 9/27/2023 2136)  Verbalizes/displays adequate comfort level or baseline comfort level: Assess pain using appropriate pain scale

## 2023-09-28 NOTE — PROGRESS NOTES
[facility]   Electrophysiology Progress Note     Date: 9/28/2023  Admit Date: 9/25/2023     Reason for consultation: Bradycardia    Chief Complaint:   Chief Complaint   Patient presents with    Emesis    Abdominal Pain     Used Slovenian - c/o abd pain and vomiting since this morning x 4 episodes. Denies fevers. History of Present Illness: History obtained from patient and medical record. Giuseppe Perry is a 67 y.o. female with a past medical history of HTN, HLD and DM. She presented with abdominal pain and nausea, vomiting  tp the ER on 9/25. She was noted to be bradycardic in the ER. She received 0.5 mg of atropine and felt better afterwards. She was started on dopamine overnight. This morning her heart rate was in 50s on dopamine. We will stop the dopamine and her heart rate later during the day went down between 37 and 45. We tried to do a stress test but she was too weak to do it. Her heart rate increased to 90 but then dropped back to 30 and she became symptomatic and dizzy. She has history of thyroid issue and we are awaiting a result of thyroid studies. Interval Hx: Today, she is sitting in the chair eating corn on the cob. She has family at the bedside who is translating , she is [place] speaking. She denies chest pain, but complains of pain with movement at pacemaker insertion site. She reports pain medication has been helping. She denies shortness of breath, dizziness or palpitations at this time. She denies any abdominal pain, or nausea. I reviewed post device care instructions with her and her family member. All questions have been answered. They verbalize understanding at this time. Her appointments will be made for follow up with device clinic and EP. She was seen and examined. Clinical notes reviewed. Telemetry reviewed. No new complaints today. No major events overnight.      Assessment and Plan:     Symptomatic Sinus Bradycardia  - telemetry 23  0654 23  0429 23  0505   WBC 4.1 4.9 6.1   HGB 10.0* 9.2* 9.4*   HCT 31.1* 28.6* 28.4*   MCV 75.7* 75.1* 74.7*    138 141       Thyroid: No results found for: \"TSH\", \"J7RLRKJ\", \"N9KMSNA\", \"THYROIDAB\"  Lipids: No results found for: \"CHOL\", \"HDL\", \"TRIG\"  LFTS:   Lab Results   Component Value Date/Time    ALT 19 2023 04:16 PM    AST 23 2023 04:16 PM    ALKPHOS 109 2023 04:16 PM    PROT 7.6 2023 04:16 PM    AGRATIO 1.4 10/25/2019 11:54 PM    BILITOT 0.7 2023 04:16 PM     Cardiac Enzymes: No results found for: \"CKTOTAL\", \"CKMB\", \"CKMBINDEX\", \"TROPONINI\"  Coags: No results found for: \"PROTIME\", \"INR\"    EC2023  - sinus bradycardia with sinus arrhythmia  ST and T wave abnormality     ECHO:  2023     Summary   Normal left ventricle size, wall thickness, and systolic function with an estimated ejection fraction of 55-60%. No regional wall motion abnormalities are seen. Normal diastolic function. The right ventricle is normal in size and function. Trivial tricuspid regurgitation with an RVSP of 26 mmHg. Stress Test: unable to complete    Cath:none    CXR 23  FINDINGS:  Cardiomediastinal silhouette is within normal limits size. Interval  placement of dual lead left subclavian cardiac pacemaker. No focal  consolidation, pleural effusion or pneumothorax. No acute osseous  abnormality. IMPRESSION:  Interval placement of dual lead left subclavian cardiac pacemaker. No postprocedural pneumothorax. Mild interstitial edema       CT ABD PELVIS 2023    FINDINGS:  Lower Chest:   Unremarkable. Organs:   Proximal pancreatic duct appears upper limits of normal in caliber. No focal pancreatic parenchymal lesion identified. Small simple right renal  cyst measuring 2.1 cm. Otherwise unremarkable. GI/Bowel:   No acute obstruction. Probable mild constipation. Few colonic  diverticula without acute diverticulitis.   No acute

## 2023-09-28 NOTE — CARE COORDINATION
NIHARIKA informed that patient was recommended for [address] [facility] from PT/OT evaluations. RN stated that family informed pt was active with [facility]. Researched this agency and called, [phone]. Rep confirmed pt is active with their [facility]  [address] services. Provided fax number to fax all necessary documents for discharge to [phone]. NIHARIKA tried this number 3x but no answer each time. Called back the agency who asked to email the documents to [email or web address] only emailed a copy of the [facility] [place] [address] [address] [facility] order. Did not feel comfortable emailing all documents with pt's information since it was a [employer] account. NIHARIKA informed [address] [place] [address] agency this and to provide a correct fax number if they would like the rest of the documents.     Discharge Plan:  Home w/[facility] [address]    Electronically signed by GARLAND Louise, LSW on 9/28/2023 at 2:49 PM

## 2023-09-28 NOTE — PROGRESS NOTES
Data- discharge order received, pt verbalized agreement to discharge, needs for [address] [facility] with [facility]) for PT/OT/Nursing, BJ reviewed and signed by MD, to be completed by RN. Action- AVS prepared, discharge instructions prepared and given to pt, medication information packet given r/t NEW or CHANGED prescriptions, pt verbalized understanding further self-review. Daughter and son in law present during discharge instructions. D/C instruction summary: Diet- cab control/cardiac, Activity- as tolerated, restrictions for LUE discussed, follow up with [address] [place] appointment in 1 week, go to your scheduled appointment with cardiology, immunizations reviewed, no new medications prescriptions to be filled. Inpatient surgical procedural reviewed. Contact information provided to above agencies used. Response- Case Management/ reported faxing completed BJ and AVS to needed HHC/DME services stated above. Pt belongings gathered, IV removed, pt dressed by daughter. Disposition is home with HHC/DME as stated above, transported with daughter and son in law, taken to lobby via w/c with RN, no complications. 1. WEIGHT: Admit Weight - Scale: 110 lb 12.8 oz (50.3 kg) (09/25/23 2315)        Today  Weight - Scale: 120 lb 9.6 oz (54.7 kg) (09/28/23 0615)       2.  O2 SAT.: SpO2: 96 % (09/28/23 1143)

## 2023-09-28 NOTE — PLAN OF CARE
Problem: Discharge Planning  Goal: Discharge to home or other facility with appropriate resources  9/28/2023 0920 by Amanda Orozco RN  Outcome: Progressing  Flowsheets (Taken 9/28/2023 0745)  Discharge to home or other facility with appropriate resources: Identify barriers to discharge with patient and caregiver     Problem: Safety - Adult  Goal: Free from fall injury  Outcome: Progressing     Problem: Chronic Conditions and Co-morbidities  Goal: Patient's chronic conditions and co-morbidity symptoms are monitored and maintained or improved  9/28/2023 0920 by Amanda Orozco RN  Outcome: Progressing  Flowsheets (Taken 9/28/2023 0745)  Care Plan - Patient's Chronic Conditions and Co-Morbidity Symptoms are Monitored and Maintained or Improved: Monitor and assess patient's chronic conditions and comorbid symptoms for stability, deterioration, or improvement     Problem: Nutrition Deficit:  Goal: Optimize nutritional status  Outcome: Progressing     Problem: Cardiovascular - Adult  Goal: Maintains optimal cardiac output and hemodynamic stability  9/28/2023 0920 by Amanda Orozco RN  Outcome: Progressing     Problem: Musculoskeletal - Adult  Goal: Return mobility to safest level of function  9/28/2023 0920 by Amanda Orozco RN  Outcome: Progressing  Flowsheets (Taken 9/28/2023 0745)  Return Mobility to Safest Level of Function:   Assist with transfers and ambulation using safe patient handling equipment as needed   Assess patient stability and activity tolerance for standing, transferring and ambulating with or without assistive devices     Problem: Metabolic/Fluid and Electrolytes - Adult  Goal: Electrolytes maintained within normal limits  9/28/2023 0920 by Amanda Orozco RN  Outcome: Progressing  Flowsheets (Taken 9/28/2023 0745)  Electrolytes maintained within normal limits: Monitor labs and assess patient for signs and symptoms of electrolyte imbalances     Problem: Pain  Goal: Verbalizes/displays adequate comfort

## 2023-10-06 ENCOUNTER — NURSE ONLY (OUTPATIENT)
Dept: CARDIOLOGY CLINIC | Age: 73
End: 2023-10-06

## 2023-10-06 DIAGNOSIS — R00.1 SYMPTOMATIC BRADYCARDIA: ICD-10-CM

## 2023-10-06 DIAGNOSIS — Z95.0 PACEMAKER: Primary | ICD-10-CM

## 2023-10-06 NOTE — PROGRESS NOTES
Dressing removed from left chest device site. SS CDI. Pt informed to call office if any redness, swelling, fever, chills, or drainage from site. Pt voiced an understanding.     See cardiology tab

## 2023-11-09 NOTE — PROGRESS NOTES
smokeless tobacco. She reports that she does not drink alcohol and does not use drugs. Family History:  Reviewed. Reviewed. No family history of SCD. Relevant and available labs, and cardiovascular diagnostics reviewed. Reviewed. I independently reviewed relevant and available cardiac diagnostic tests ECG, CXR, Echo, Stress test, Device interrogation, Holter, CT scan. Outside medical records via Care everywhere reviewed and summarized in H&P above. Complex medical condition with multiple medical problems affecting prognosis and outcome of EP interventions       - The patient is counseled to follow a low salt diet to assure blood pressure remains controlled for cardiovascular risk factor modification.   - The patient is counseled to avoid excess caffeine, and energy drinks as this may exacerbated ectopy and arrhythmia. - The patient is counseled to get regular exercise 3-5 times per week to control cardiovascular risk factors. All questions and concerns were addressed to the patient/family. Alternatives to my treatment were discussed. I have discussed the above stated plan and the patient verbalized understanding and agreed with the plan. Scribe attestation: This note was scribed in the presence of Ladan Conteh MD by Tri Sanders LPN    I, Dr. Ladan Conteh personally performed the services described in this documentation as scribed by RN in my presence, and it is both accurate and complete. NOTE: This report was transcribed using voice recognition software. Every effort was made to ensure accuracy, however, inadvertent computerized transcription errors may be present.      Ladan Conteh MD, [facility]   Office: [phone]  Fax: [phone]

## 2023-11-16 ENCOUNTER — OFFICE VISIT (OUTPATIENT)
Dept: CARDIOLOGY CLINIC | Age: 73
End: 2023-11-16
Payer: MEDICAID

## 2023-11-16 ENCOUNTER — NURSE ONLY (OUTPATIENT)
Dept: CARDIOLOGY CLINIC | Age: 73
End: 2023-11-16

## 2023-11-16 VITALS
BODY MASS INDEX: 22.5 KG/M2 | HEART RATE: 82 BPM | WEIGHT: 122.3 LBS | DIASTOLIC BLOOD PRESSURE: 62 MMHG | SYSTOLIC BLOOD PRESSURE: 118 MMHG | OXYGEN SATURATION: 96 % | HEIGHT: 62 IN

## 2023-11-16 DIAGNOSIS — R00.1 SYMPTOMATIC BRADYCARDIA: ICD-10-CM

## 2023-11-16 DIAGNOSIS — Z95.0 PACEMAKER: Primary | ICD-10-CM

## 2023-11-16 DIAGNOSIS — R00.1 BRADYCARDIA: ICD-10-CM

## 2023-11-16 DIAGNOSIS — I10 BENIGN ESSENTIAL HTN: Primary | ICD-10-CM

## 2023-11-16 DIAGNOSIS — Z95.0 PACEMAKER: ICD-10-CM

## 2023-11-16 PROCEDURE — 3074F SYST BP LT 130 MM HG: CPT | Performed by: INTERNAL MEDICINE

## 2023-11-16 PROCEDURE — 93000 ELECTROCARDIOGRAM COMPLETE: CPT | Performed by: INTERNAL MEDICINE

## 2023-11-16 PROCEDURE — 1123F ACP DISCUSS/DSCN MKR DOCD: CPT | Performed by: INTERNAL MEDICINE

## 2023-11-16 PROCEDURE — 3078F DIAST BP <80 MM HG: CPT | Performed by: INTERNAL MEDICINE

## 2023-11-16 PROCEDURE — 99214 OFFICE O/P EST MOD 30 MIN: CPT | Performed by: INTERNAL MEDICINE

## 2024-01-26 PROCEDURE — 93294 REM INTERROG EVL PM/LDLS PM: CPT | Performed by: INTERNAL MEDICINE

## 2024-01-26 PROCEDURE — 93296 REM INTERROG EVL PM/IDS: CPT | Performed by: INTERNAL MEDICINE

## 2024-03-21 ENCOUNTER — HOSPITAL ENCOUNTER (EMERGENCY)
Age: 74
Discharge: HOME | End: 2024-03-21
Payer: MEDICAID

## 2024-03-21 VITALS
SYSTOLIC BLOOD PRESSURE: 162 MMHG | DIASTOLIC BLOOD PRESSURE: 78 MMHG | WEIGHT: 109.2 LBS | HEART RATE: 61 BPM | RESPIRATION RATE: 16 BRPM | BODY MASS INDEX: 19.97 KG/M2 | TEMPERATURE: 98.1 F | OXYGEN SATURATION: 97 %

## 2024-03-21 DIAGNOSIS — H57.9 ITCH OF EYE, RIGHT: Primary | ICD-10-CM

## 2024-03-21 PROCEDURE — 99283 EMERGENCY DEPT VISIT LOW MDM: CPT

## 2024-03-21 RX ORDER — SULFACETAMIDE SODIUM 100 MG/ML
2 SOLUTION/ DROPS OPHTHALMIC
Qty: 1 EACH | Refills: 0 | Status: SHIPPED | OUTPATIENT
Start: 2024-03-21 | End: 2024-03-26

## 2024-03-21 ASSESSMENT — PAIN SCALES - GENERAL: PAINLEVEL_OUTOF10: 10

## 2024-03-21 ASSESSMENT — ENCOUNTER SYMPTOMS
CHEST TIGHTNESS: 0
VOMITING: 0
DIARRHEA: 0
EYE REDNESS: 0
EYE PAIN: 0
PHOTOPHOBIA: 0
ABDOMINAL PAIN: 0
NAUSEA: 0
EYE ITCHING: 1
EYE DISCHARGE: 1

## 2024-03-21 ASSESSMENT — LIFESTYLE VARIABLES
HOW OFTEN DO YOU HAVE A DRINK CONTAINING ALCOHOL: NEVER
HOW MANY STANDARD DRINKS CONTAINING ALCOHOL DO YOU HAVE ON A TYPICAL DAY: PATIENT DOES NOT DRINK

## 2024-03-21 ASSESSMENT — PAIN - FUNCTIONAL ASSESSMENT: PAIN_FUNCTIONAL_ASSESSMENT: 0-10

## 2024-03-21 NOTE — ED PROVIDER NOTES
[facility] EMERGENCY DEPARTMENT  EMERGENCY DEPARTMENT ENCOUNTER        Pt Name: Josey Gee  MRN: [id number]  Birthdate 1950  Date of evaluation: 3/21/2024  Provider: Elliot Jason PA-C  PCP: System, Referring Not In (Inactive)  Note Started: 7:25 PM EDT 3/21/24      CAR. I have evaluated this patient.        CHIEF COMPLAINT       Chief Complaint   Patient presents with    Eye Pain     Pt states right eye pain and itching that started yesterday.        HISTORY OF PRESENT ILLNESS: 1 or more Elements     History from : Patient    Limitations to history : None    Josey Gee is a 73 y.o. female who presents to the emergency department today complaining of right eye itching and watering that began yesterday.  She denies eye pain.  She denies vision changes.  She denies headache, lightheadedness or dizziness        Nursing Notes were all reviewed and agreed with or any disagreements were addressed in the HPI.    REVIEW OF SYSTEMS :      Review of Systems   Constitutional:  Negative for chills and fever.   Eyes:  Positive for discharge and itching. Negative for photophobia, pain, redness and visual disturbance.   Respiratory:  Negative for chest tightness and shortness of breath.    Cardiovascular:  Negative for chest pain.   Gastrointestinal:  Negative for abdominal pain, diarrhea, nausea and vomiting.   Genitourinary:  Negative for dysuria.   Neurological:  Negative for dizziness, weakness, light-headedness, numbness and headaches.   All other systems reviewed and are negative.      Positives and Pertinent negatives as per HPI.     SURGICAL HISTORY   History reviewed. No pertinent surgical history.    CURRENTMEDICATIONS       Discharge Medication List as of 3/21/2024  6:48 PM        CONTINUE these medications which have NOT CHANGED    Details   linagliptin (TRADJENTA) 5 MG tablet Take 1 tablet by mouth dailyHistorical Med      loratadine (CLARITIN) 10 MG tablet Take 1 tablet by mouth

## 2024-04-17 ENCOUNTER — HOSPITAL ENCOUNTER (EMERGENCY)
Age: 74
Discharge: HOME | End: 2024-04-17
Payer: MEDICAID

## 2024-04-17 VITALS
WEIGHT: 115 LBS | RESPIRATION RATE: 15 BRPM | DIASTOLIC BLOOD PRESSURE: 81 MMHG | BODY MASS INDEX: 22.58 KG/M2 | SYSTOLIC BLOOD PRESSURE: 181 MMHG | HEART RATE: 63 BPM | TEMPERATURE: 98.3 F | HEIGHT: 60 IN | OXYGEN SATURATION: 98 %

## 2024-04-17 DIAGNOSIS — J06.9 ACUTE UPPER RESPIRATORY INFECTION: ICD-10-CM

## 2024-04-17 DIAGNOSIS — J02.9 ACUTE PHARYNGITIS, UNSPECIFIED ETIOLOGY: Primary | ICD-10-CM

## 2024-04-17 LAB
FLUAV RNA RESP QL NAA+PROBE: NOT DETECTED
FLUBV RNA RESP QL NAA+PROBE: NOT DETECTED
RSV AG NOSE QL: NEGATIVE
S PYO AG THROAT QL: NEGATIVE
SARS-COV-2 RNA RESP QL NAA+PROBE: NOT DETECTED

## 2024-04-17 PROCEDURE — 87880 STREP A ASSAY W/OPTIC: CPT

## 2024-04-17 PROCEDURE — 6370000000 HC RX 637 (ALT 250 FOR IP): Performed by: PHYSICIAN ASSISTANT

## 2024-04-17 PROCEDURE — 87081 CULTURE SCREEN ONLY: CPT

## 2024-04-17 PROCEDURE — 99283 EMERGENCY DEPT VISIT LOW MDM: CPT

## 2024-04-17 PROCEDURE — 87807 RSV ASSAY W/OPTIC: CPT

## 2024-04-17 PROCEDURE — 87636 SARSCOV2 & INF A&B AMP PRB: CPT

## 2024-04-17 RX ORDER — ACETAMINOPHEN 500 MG
1000 TABLET ORAL ONCE
Status: COMPLETED | OUTPATIENT
Start: 2024-04-17 | End: 2024-04-17

## 2024-04-17 RX ADMIN — ACETAMINOPHEN 1000 MG: 500 TABLET ORAL at 21:38

## 2024-04-17 ASSESSMENT — ENCOUNTER SYMPTOMS
NAUSEA: 0
ABDOMINAL PAIN: 0
RHINORRHEA: 0
DIARRHEA: 0
SORE THROAT: 1
VOMITING: 0
SHORTNESS OF BREATH: 0
COUGH: 1

## 2024-04-18 NOTE — ED TRIAGE NOTES
Pt arrives with c/o sore throat and fever since yesterday. Child in home also sick, seen here earlier negative for all swabs.

## 2024-04-18 NOTE — ED PROVIDER NOTES
radiographic images are visualized and preliminarily interpreted by the ED Provider with the below findings:        Interpretation per the Radiologist below, if available at the time of this note:    No orders to display     No results found.    No results found.    PROCEDURES   Unless otherwise noted below, none     Procedures    CRITICAL CARE TIME (.cctime)       PAST MEDICAL HISTORY      has a past medical history of Diabetes mellitus (HCC) and Hypertension.     EMERGENCY DEPARTMENT COURSE and DIFFERENTIAL DIAGNOSIS/MDM:   Vitals:    Vitals:    04/17/24 2055   BP: (!) 181/81   Pulse: 63   Resp: 15   Temp: 98.3 °F (36.8 °C)   TempSrc: Oral   SpO2: 98%   Weight: 52.2 kg (115 lb)   Height: 1.524 m (5')       Patient was given the following medications:  Medications   acetaminophen (TYLENOL) tablet 1,000 mg (1,000 mg Oral Given 4/17/24 2138)             Is this patient to be included in the SEP-1 Core Measure due to severe sepsis or septic shock?   No   Exclusion criteria - the patient is NOT to be included for SEP-1 Core Measure due to:  Infection is not suspected    Chronic Conditions affecting care:  has a past medical history of Diabetes mellitus (HCC) and Hypertension.    CONSULTS: (Who and What was discussed)  None      Social Determinants Significantly Affecting Health : Language barrier    Records Reviewed (External and Source) previous cardiology office visit on 11/16/2023    CC/HPI Summary, DDx, ED Course, and Reassessment: Briefly, this is a 73-year-old female who presents to the emergency department today for evaluation for concerns of fever, cough, congestion, sore throat.  Symptoms began yesterday.  There is a child at home with similar symptoms    Physical examination, she is hypertensive, vital signs are otherwise stable.  Lungs are clear.  Posterior pharynx is mildly erythematous.    Disposition Considerations (tests considered but not done, Admit vs D/C, Shared Decision Making, Pt Expectation of

## 2024-04-21 LAB — S PYO THROAT QL CULT: NORMAL

## 2024-07-03 NOTE — PROGRESS NOTES
Patient reached __X__ yes--spoke with son-in-law, Judi, who speaks English    Date _7/10/24________  Time _1430______  Arrival _1300  [facility]      Nothing to eat or drink after midnight-follow your doctors prep instructions-this may include taking a second dose of your prep after midnight  Responsible adult 18 or older to stay on site while you are here-drive you home-stay with you after  Follow any instructions your doctors office has given you  Bring a complete list of all your medications and supplements including name,dose,how often taken the day of your procedure  If you normally take the following medications in the morning please do so the AM of your procedure with a small sip of water       Heart,blood pressure,seizure,thyroid or breathing medications-use your inhalers-bring any rescue inhalers with you DOS       DO NOT take blood pressure medications ending in \"emilie\" or \"pril\" the AM of procedure or evening prior-DO NOT TAKE JULIETTE Thornton patients are not to take any medications the AM of surgery  Take half or your normal dose of any long acting insulins the night before your procedure-do not take any diabetic medications the AM of procedure  Follow your doctors instructions regarding stopping or taking  any blood thinners-if you do not have instructions-call them  Any questions call your doctor  Other ______take prednisone am of procedure________________________________________________________        ARRANGED    Company __[employer]____    Language _Nepali_____    Date __7/10/24_______    Arrival Time _1300_____    Length of time _3.5 hours_____    Place __[facility]    Confirmation Number _[phone]_____       VISITOR POLICY(subject to change)             The current policy is 2 visitors per patient.There are no children allowed.Mask at discretion of facility. Visiting hours are 8a-8p.Overnight visitors will be at the discretion of the nurse. All policies are subject to change.

## 2024-07-10 ENCOUNTER — ANESTHESIA (OUTPATIENT)
Dept: ENDOSCOPY | Age: 74
End: 2024-07-10
Payer: MEDICAID

## 2024-07-10 ENCOUNTER — ANESTHESIA EVENT (OUTPATIENT)
Dept: ENDOSCOPY | Age: 74
End: 2024-07-10
Payer: MEDICAID

## 2024-07-10 ENCOUNTER — HOSPITAL ENCOUNTER (OUTPATIENT)
Age: 74
Setting detail: OUTPATIENT SURGERY
Discharge: HOME | End: 2024-07-10
Attending: INTERNAL MEDICINE | Admitting: INTERNAL MEDICINE
Payer: MEDICAID

## 2024-07-10 VITALS
DIASTOLIC BLOOD PRESSURE: 83 MMHG | TEMPERATURE: 96.8 F | OXYGEN SATURATION: 100 % | SYSTOLIC BLOOD PRESSURE: 185 MMHG | WEIGHT: 115 LBS | HEIGHT: 61 IN | HEART RATE: 71 BPM | RESPIRATION RATE: 17 BRPM | BODY MASS INDEX: 21.71 KG/M2

## 2024-07-10 DIAGNOSIS — D50.9 IRON DEFICIENCY ANEMIA, UNSPECIFIED IRON DEFICIENCY ANEMIA TYPE: ICD-10-CM

## 2024-07-10 LAB
GLUCOSE BLD-MCNC: 156 MG/DL (ref 70–99)
GLUCOSE BLD-MCNC: 157 MG/DL (ref 70–99)
PERFORMED ON: ABNORMAL
PERFORMED ON: ABNORMAL

## 2024-07-10 PROCEDURE — 3700000000 HC ANESTHESIA ATTENDED CARE: Performed by: INTERNAL MEDICINE

## 2024-07-10 PROCEDURE — 88305 TISSUE EXAM BY PATHOLOGIST: CPT

## 2024-07-10 PROCEDURE — 2580000003 HC RX 258: Performed by: INTERNAL MEDICINE

## 2024-07-10 PROCEDURE — 7100000011 HC PHASE II RECOVERY - ADDTL 15 MIN: Performed by: INTERNAL MEDICINE

## 2024-07-10 PROCEDURE — 2709999900 HC NON-CHARGEABLE SUPPLY: Performed by: INTERNAL MEDICINE

## 2024-07-10 PROCEDURE — 7100000001 HC PACU RECOVERY - ADDTL 15 MIN: Performed by: INTERNAL MEDICINE

## 2024-07-10 PROCEDURE — 3700000001 HC ADD 15 MINUTES (ANESTHESIA): Performed by: INTERNAL MEDICINE

## 2024-07-10 PROCEDURE — 6360000002 HC RX W HCPCS: Performed by: NURSE ANESTHETIST, CERTIFIED REGISTERED

## 2024-07-10 PROCEDURE — 2500000003 HC RX 250 WO HCPCS: Performed by: NURSE ANESTHETIST, CERTIFIED REGISTERED

## 2024-07-10 PROCEDURE — 3609010600 HC COLONOSCOPY POLYPECTOMY SNARE/COLD BIOPSY: Performed by: INTERNAL MEDICINE

## 2024-07-10 PROCEDURE — 7100000000 HC PACU RECOVERY - FIRST 15 MIN: Performed by: INTERNAL MEDICINE

## 2024-07-10 PROCEDURE — 7100000010 HC PHASE II RECOVERY - FIRST 15 MIN: Performed by: INTERNAL MEDICINE

## 2024-07-10 PROCEDURE — 3609012400 HC EGD TRANSORAL BIOPSY SINGLE/MULTIPLE: Performed by: INTERNAL MEDICINE

## 2024-07-10 RX ORDER — GLYCOPYRROLATE 0.2 MG/ML
INJECTION INTRAMUSCULAR; INTRAVENOUS PRN
Status: DISCONTINUED | OUTPATIENT
Start: 2024-07-10 | End: 2024-07-10 | Stop reason: SDUPTHER

## 2024-07-10 RX ORDER — SODIUM CHLORIDE 9 MG/ML
INJECTION, SOLUTION INTRAVENOUS CONTINUOUS
Status: DISCONTINUED | OUTPATIENT
Start: 2024-07-10 | End: 2024-07-10 | Stop reason: HOSPADM

## 2024-07-10 RX ORDER — PROPOFOL 10 MG/ML
INJECTION, EMULSION INTRAVENOUS PRN
Status: DISCONTINUED | OUTPATIENT
Start: 2024-07-10 | End: 2024-07-10 | Stop reason: SDUPTHER

## 2024-07-10 RX ORDER — PROPOFOL 10 MG/ML
INJECTION, EMULSION INTRAVENOUS CONTINUOUS PRN
Status: DISCONTINUED | OUTPATIENT
Start: 2024-07-10 | End: 2024-07-10 | Stop reason: SDUPTHER

## 2024-07-10 RX ORDER — LIDOCAINE HYDROCHLORIDE 20 MG/ML
INJECTION, SOLUTION INFILTRATION; PERINEURAL PRN
Status: DISCONTINUED | OUTPATIENT
Start: 2024-07-10 | End: 2024-07-10 | Stop reason: SDUPTHER

## 2024-07-10 RX ADMIN — SODIUM CHLORIDE: 9 INJECTION, SOLUTION INTRAVENOUS at 11:04

## 2024-07-10 RX ADMIN — PROPOFOL 50 MG: 10 INJECTION, EMULSION INTRAVENOUS at 11:17

## 2024-07-10 RX ADMIN — GLYCOPYRROLATE 0.2 MG: 0.2 INJECTION, SOLUTION INTRAMUSCULAR; INTRAVENOUS at 11:15

## 2024-07-10 RX ADMIN — PROPOFOL 60 MCG/KG/MIN: 10 INJECTION, EMULSION INTRAVENOUS at 11:19

## 2024-07-10 RX ADMIN — PROPOFOL 50 MG: 10 INJECTION, EMULSION INTRAVENOUS at 11:42

## 2024-07-10 RX ADMIN — PROPOFOL 50 MG: 10 INJECTION, EMULSION INTRAVENOUS at 11:22

## 2024-07-10 RX ADMIN — PROPOFOL 50 MG: 10 INJECTION, EMULSION INTRAVENOUS at 11:32

## 2024-07-10 RX ADMIN — LIDOCAINE HYDROCHLORIDE 100 MG: 20 INJECTION, SOLUTION INFILTRATION; PERINEURAL at 11:17

## 2024-07-10 RX ADMIN — PROPOFOL 50 MG: 10 INJECTION, EMULSION INTRAVENOUS at 11:27

## 2024-07-10 RX ADMIN — PROPOFOL 50 MG: 10 INJECTION, EMULSION INTRAVENOUS at 11:37

## 2024-07-10 ASSESSMENT — PAIN - FUNCTIONAL ASSESSMENT: PAIN_FUNCTIONAL_ASSESSMENT: 0-10

## 2024-07-10 ASSESSMENT — ENCOUNTER SYMPTOMS: SHORTNESS OF BREATH: 0

## 2024-07-10 NOTE — PROCEDURES
EGD and Colonoscopy Procedure  Note            Patient: Josey Gee  : 1950  CSN:     Procedure: Esophagogastroduodenoscopy with biopsy.  Colonoscopy with cold snare polypectomy    Date:  7/10/2024     Surgeon:  MELISSA VINCENT MD     Referring Provider:      Preoperative Diagnosis: Iron deficiency anemia/bloating/change in bowel habits    Postoperative Diagnosis:    -Antral gastritis  -Irregular Z-line  -Suboptimal to patchy prep  -Sigmoid colon polyp (6 mm)    Anesthesia: Propofol sedation    EBL: <50 mL    Indications: This is a 73 y.o. year old female who presents today with an altered bowel movement      Procedure Details:    With the patient in left lateral position the endoscope was passed through the hypopharynx into the esophagus. The scope was advanced all the way to the duodenum.  Duodenal biopsies were obtained given her history for iron deficiency anemia.  The mucosa in the duodenal bulb, post bulbar region in the descending duodenum appeared normal.  The mucosa in the antrum revealed antral gastritis, biopsies obtained to rule out Helicobacter pylori infection appeared normal.  The mucosa in the remaining part of the stomach and retroflexion appeared normal.  The GE junction was at around 40 cms, with irregular Z-line, biopsies obtained to rule out Goodmna's.The mucosa in the remainder of the esophagus appeared normal.  The scope was withdrawn and the procedure was terminated.        Procedure Details:    With the patient in left lateral decubitus position the endoscope was inserted through the anorectal area into the rectum. The scope was then advanced through the length of the colon to the cecum. The ileocecal valve was visualized and cannulated. The quality of preparation was suboptimal to patchy. Water was insufflated through the biopsy channel to clean out the colon and look at the underlying mucosa. The scope was carefully withdrawn and found to be normal.    Digital rectal

## 2024-07-10 NOTE — ANESTHESIA POSTPROCEDURE EVALUATION
Department of Anesthesiology  Postprocedure Note    Patient: Josey Gee  MRN: [id number]  YOB: 1950  Date of evaluation: 7/10/2024    Procedure Summary       Date: 07/10/24 Room / Location: [place] [address] / [facility]    Anesthesia Start: 1111 Anesthesia Stop: 1153    Procedures:       COLONOSCOPY POLYPECTOMY SNARE/BIOPSY      ESOPHAGOGASTRODUODENOSCOPY BIOPSY (Abdomen) Diagnosis:       Iron deficiency anemia, unspecified iron deficiency anemia type      (Iron deficiency anemia, unspecified iron deficiency anemia type [D50.9])    Surgeons: Andrea Hagan MD Responsible Provider: Steph Orona MD    Anesthesia Type: MAC ASA Status: 3            Anesthesia Type: No value filed.    Bryson Phase I: Bryson Score: 5    Bryson Phase II:      Anesthesia Post Evaluation    Patient location during evaluation: PACU  Level of consciousness: awake  Airway patency: patent  Cardiovascular status: hemodynamically stable  Respiratory status: acceptable  There was medical reason for not using a multimodal analgesia pain management approach.    No notable events documented.

## 2024-07-10 NOTE — PROGRESS NOTES
Pt discharged via wheelchair. Pt discharged with instructions and all belongings. Pt TRISHA taking stable pt home.

## 2024-07-10 NOTE — DISCHARGE INSTRUCTIONS
From Dr. Hagan:      Recommendations:   Await pathology of biopsies  Repeat colonoscopy in 1 years.  Follow up with primary care physician.  Pending pathology may consider capsule study if indicated      ENDOSCOPY DISCHARGE INSTRUCTIONS    You may experience some lightheadedness for the next several hours.  Plan on quiet relaxation for the rest of today.  A responsible adult needs to stay with you today.  Because of the medications you received today-do not drive,operate machinery,or sign any contractual agreement for the next 24 hours.  Do not drink any alcoholic beverages or take any unprescribed medications tonight.  Eat bland food and avoid anything greasy or spicy initially-progress to your normal diet gradually.  Diet restrictions as instructed.  You may resume home medications as instructed.  It is not unusual to experience some mild cramping or gas pains, and you may not have a bowel movement for several days.  If you have any of the following problems, notify your physician or return to the hospital emergency room : fever, chills, excessive bleeding, excessive vomiting, difficulty swallowing, uncontrolled pain, increased abdominal distention, shortness of breath or any other problems.  If you had a polyp removed, avoid strenuous activity for 48 hours.Avoid the use of aspirin or related compounds for one week, unless otherwise instructed by your physician.  You may notice a small amount of blood in your next few bowel movements, but if a large amount passes, call your physician.  If you have a sore throat, you may use lozenges or salt water gargles.  If you had biopsy's taken from your procedure call the office for results in 5-7 days.     ANESTHESIA DISCHARGE INSTRUCTIONS    Wear your seatbelt home.  You are under the influence of drugs-do not drink alcohol,drive,operate machinery,or make any important decisions or sign any legal documentsfor 24 hours  A responsible adult needs to be with you for 24

## 2024-07-10 NOTE — H&P
Gastroenterology Note             Pre-operative History and Physical    Patient: Josey Gee  : 1950  CSN:     History Obtained From:  patient and/or guardian.     HISTORY OF PRESENT ILLNESS:    The patient is a 73 y.o. female  here for EGD/colonoscopy.  Iron deficiency anemia, abdominal pain bloating, altered bowel movement    Past Medical History:    Past Medical History:   Diagnosis Date    Diabetes mellitus (HCC)     Hypertension      Past Surgical History:    Past Surgical History:   Procedure Laterality Date    PACEMAKER INSERTION       Medications Prior to Admission:   No current facility-administered medications on file prior to encounter.     Current Outpatient Medications on File Prior to Encounter   Medication Sig Dispense Refill    linagliptin (TRADJENTA) 5 MG tablet Take 1 tablet by mouth daily      loratadine (CLARITIN) 10 MG tablet Take 1 tablet by mouth daily      valsartan (DIOVAN) 80 MG tablet Take 1 tablet by mouth daily      metFORMIN (GLUCOPHAGE) 1000 MG tablet Take 1 tablet by mouth 2 times daily (with meals)      omeprazole (PRILOSEC) 20 MG delayed release capsule Take 1 capsule by mouth daily      atorvastatin (LIPITOR) 40 MG tablet Take 1 tablet by mouth daily      Spacer/Aero Chamber Mouthpiece MISC 1 each by Does not apply route once as needed (wheeze) 1 each 0    albuterol sulfate HFA (VENTOLIN HFA) 108 (90 Base) MCG/ACT inhaler Inhale 2 puffs into the lungs every 4 hours as needed for Wheezing or Shortness of Breath (Patient not taking: Reported on 7/3/2024) 1 Inhaler 0    predniSONE (DELTASONE) 50 MG tablet Take 1 tablet PO daily x 5 days 5 tablet 0    ondansetron (ZOFRAN ODT) 4 MG disintegrating tablet Take 1-2 tablets by mouth every 12 hours as needed for Nausea May Sub regular tablet (non-ODT) if insurance does not cover ODT. 12 tablet 0    famotidine (PEPCID) 20 MG tablet Take 1 tablet by mouth 2 times daily 60 tablet 0    calcium carbonate (OSCAL) 500 MG TABS

## 2024-07-10 NOTE — ANESTHESIA PRE PROCEDURE
Department of Anesthesiology  Preprocedure Note       Name:  Josey Gee   Age:  73 y.o.  :  1950                                          MRN:  [id number]         Date:  7/10/2024      Surgeon: Surgeon(s):  Andrea Hagan MD    Procedure: Procedure(s):  COLONOSCOPY DIAGNOSTIC  ESOPHAGOGASTRODUODENOSCOPY DIAGNOSTIC ONLY    Medications prior to admission:   Prior to Admission medications    Medication Sig Start Date End Date Taking? Authorizing Provider   linagliptin (TRADJENTA) 5 MG tablet Take 1 tablet by mouth daily    Sharyn Escobar MD   loratadine (CLARITIN) 10 MG tablet Take 1 tablet by mouth daily    Sharyn Escobar MD   valsartan (DIOVAN) 80 MG tablet Take 1 tablet by mouth daily    Sharyn Escobar MD   metFORMIN (GLUCOPHAGE) 1000 MG tablet Take 1 tablet by mouth 2 times daily (with meals)    Sharyn Escobar MD   omeprazole (PRILOSEC) 20 MG delayed release capsule Take 1 capsule by mouth daily    Sharyn Escobar MD   atorvastatin (LIPITOR) 40 MG tablet Take 1 tablet by mouth daily    Sharyn Escobar MD   Spacer/Aero Chamber Mouthpiece MISC 1 each by Does not apply route once as needed (wheeze) 18  Srini Worrell DO   albuterol sulfate HFA (VENTOLIN HFA) 108 (90 Base) MCG/ACT inhaler Inhale 2 puffs into the lungs every 4 hours as needed for Wheezing or Shortness of Breath  Patient not taking: Reported on 7/3/2024 2/11/18   Srini Worrell DO   predniSONE (DELTASONE) 50 MG tablet Take 1 tablet PO daily x 5 days 18   Srini Worrell DO   ondansetron (ZOFRAN ODT) 4 MG disintegrating tablet Take 1-2 tablets by mouth every 12 hours as needed for Nausea May Sub regular tablet (non-ODT) if insurance does not cover ODT. 18   Romelia Torres PA-C   famotidine (PEPCID) 20 MG tablet Take 1 tablet by mouth 2 times daily 18   Romelia Torres PA-C   calcium carbonate (OSCAL) 500 MG TABS tablet Take 1 tablet by mouth

## 2024-08-06 ENCOUNTER — OFFICE VISIT (OUTPATIENT)
Dept: ORTHOPEDIC SURGERY | Age: 74
End: 2024-08-06
Payer: MEDICAID

## 2024-08-06 VITALS — BODY MASS INDEX: 21.71 KG/M2 | WEIGHT: 115 LBS | HEIGHT: 61 IN

## 2024-08-06 DIAGNOSIS — M25.561 RIGHT KNEE PAIN, UNSPECIFIED CHRONICITY: Primary | ICD-10-CM

## 2024-08-06 DIAGNOSIS — M25.562 LEFT KNEE PAIN, UNSPECIFIED CHRONICITY: ICD-10-CM

## 2024-08-06 PROCEDURE — 99204 OFFICE O/P NEW MOD 45 MIN: CPT | Performed by: ORTHOPAEDIC SURGERY

## 2024-08-06 PROCEDURE — 20610 DRAIN/INJ JOINT/BURSA W/O US: CPT | Performed by: ORTHOPAEDIC SURGERY

## 2024-08-06 PROCEDURE — 1123F ACP DISCUSS/DSCN MKR DOCD: CPT | Performed by: ORTHOPAEDIC SURGERY

## 2024-08-06 RX ORDER — BUPIVACAINE HYDROCHLORIDE 5 MG/ML
4 INJECTION, SOLUTION PERINEURAL ONCE
Status: COMPLETED | OUTPATIENT
Start: 2024-08-06 | End: 2024-08-06

## 2024-08-06 RX ORDER — METHYLPREDNISOLONE ACETATE 40 MG/ML
40 INJECTION, SUSPENSION INTRA-ARTICULAR; INTRALESIONAL; INTRAMUSCULAR; SOFT TISSUE ONCE
Status: COMPLETED | OUTPATIENT
Start: 2024-08-06 | End: 2024-08-06

## 2024-08-06 RX ADMIN — METHYLPREDNISOLONE ACETATE 40 MG: 40 INJECTION, SUSPENSION INTRA-ARTICULAR; INTRALESIONAL; INTRAMUSCULAR; SOFT TISSUE at 11:58

## 2024-08-06 RX ADMIN — BUPIVACAINE HYDROCHLORIDE 20 MG: 5 INJECTION, SOLUTION PERINEURAL at 11:58

## 2024-08-06 RX ADMIN — BUPIVACAINE HYDROCHLORIDE 20 MG: 5 INJECTION, SOLUTION PERINEURAL at 11:57

## 2024-08-06 RX ADMIN — METHYLPREDNISOLONE ACETATE 40 MG: 40 INJECTION, SUSPENSION INTRA-ARTICULAR; INTRALESIONAL; INTRAMUSCULAR; SOFT TISSUE at 11:59

## 2024-08-20 NOTE — PROGRESS NOTES
The patient is oriented to time, place and person.  The patient's mood and affect are appropriate.  Lymphatic: The lymphatic examination bilaterally reveals all areas to be without enlargement or induration.  Vascular: Examination reveals no swelling or calf tenderness.  Peripheral pulses are palpable and 2+.  Neurological: The patient has good coordination.  There is no weakness or sensory deficit.  Skin:  Head/Neck: inspection reveals no rashes, ulcerations or lesions.Trunk: inspection reveals no rashes, ulcerations or lesions.    Objective:  Ht 1.549 m (5' 1\")   Wt 52.2 kg (115 lb)   BMI 21.73 kg/m²      Physical Exam:  The patient is well-appearing and in no apparent distress  Examination of the right and left knee   There is a small effusion, no gross deformity or skin changes  Range of motion reveals 0 to 125  Neg lachman, negative posterior drawer, no pain or laxity with varus or valgus stress at 0 degrees and 30 degrees of flexion  + joint line tenderness  5 out of 5 strength throughout distal muscle groups  Sensation is intact to light touch throughout all distributions  There is no calf swelling or tenderness  Palpable DP pulse, brisk cap refill, 2+ symmetric reflexes     Imagin view x-rays of the right and left knees were obtained in office today on 2024 reviewed.  There is no fracture or dislocation.  Tricompartmental degenerative changes are present decreased joint space and osteophyte formation.      Assessment:  Bilateral knee degenerative joint disease    Plan:  I discussed with the patient the diagnosis and treatment options.  We discussed operative and nonoperative management.  At this point I do recommend nonoperative management.  Nonoperative treatment options include activity modification, anti-inflammatory medications, physical therapy, and injections. The patient elected proceed with cortisone injection.  Therefore injection was given to the right and left knees via sterile

## 2024-11-14 NOTE — PROGRESS NOTES
[facility]   Electrophysiology Follow up   Date: 11/20/2024  I had the privilege of visiting Josey Gee in the office.     CC: Bradycardia   HPI: Josey Gee is a 73 y.o. female with a past medical history of bradycardia, hypertension, hyperlipidemia, and diabetes.      09/25/23 Patient presented to [facility] ED with complaints of abdominal pain, nausea, and vomiting. On arrival HR was noted to be in the 40s. ECG noted sinus bradycardia with rate of 50 bpm. Dose of Atropine given with improvement and rate in the 80s. Started on Dopamine overnight with rate in the 50s, dopamine stopped. Unable to complete stress test d/t weakness. Heart rate increased to 90 but then dropped back to 30 and she became symptomatic and dizzy.   Underwent dual chamber PPM 09/27/23.      Interval History:  Josey presents today in annual follow up. Unable to utilize  today due to tech issues so her son translated for her. She is feeling well today but does complain of occasional SOB while being active.     Assessment and plan:   Symptomatic Bradycardia               - s/p dual chamber PPM 09/27/23              - Interrogation today shows: 12.6 years remaining, AP 77.7% ,  <0.1%,  <0.1% AT/AF burden per device interrogation today, Underlying junctional peg, presenting APVS @ 88 bpm   -All episodes appear to be short atrial runs. Longest 7 seconds   - will increase rate response today  Increase the rate response to allow for better heart rate and cardiac output during activity.     HTN  - Controlled: 100/60  - BP goal <130/80  - Home BP monitoring encouraged, printed information provided on how to accurately measure BP at home.  - Counseled to follow a low salt diet to assure blood pressure remains controlled for cardiovascular risk factor modification.   - The patient is counseled to get regular exercise 3-5 times per week and maintain a healthy weight reduce cardiovascular risk factors.   - Continue on Valsartan

## 2024-11-20 ENCOUNTER — OFFICE VISIT (OUTPATIENT)
Dept: CARDIOLOGY CLINIC | Age: 74
End: 2024-11-20

## 2024-11-20 ENCOUNTER — NURSE ONLY (OUTPATIENT)
Dept: CARDIOLOGY CLINIC | Age: 74
End: 2024-11-20

## 2024-11-20 VITALS
SYSTOLIC BLOOD PRESSURE: 100 MMHG | OXYGEN SATURATION: 96 % | HEART RATE: 67 BPM | BODY MASS INDEX: 21.52 KG/M2 | HEIGHT: 61 IN | DIASTOLIC BLOOD PRESSURE: 60 MMHG | WEIGHT: 114 LBS

## 2024-11-20 DIAGNOSIS — R00.1 SYMPTOMATIC BRADYCARDIA: ICD-10-CM

## 2024-11-20 DIAGNOSIS — R06.02 SHORTNESS OF BREATH: ICD-10-CM

## 2024-11-20 DIAGNOSIS — Z95.0 PACEMAKER: ICD-10-CM

## 2024-11-20 DIAGNOSIS — R00.1 SYMPTOMATIC BRADYCARDIA: Primary | ICD-10-CM

## 2024-11-20 DIAGNOSIS — Z95.0 PACEMAKER: Primary | ICD-10-CM

## 2024-11-20 DIAGNOSIS — I15.2 HYPERTENSION ASSOCIATED WITH DIABETES (HCC): ICD-10-CM

## 2024-11-20 DIAGNOSIS — E11.59 HYPERTENSION ASSOCIATED WITH DIABETES (HCC): ICD-10-CM

## 2025-07-21 ENCOUNTER — HOSPITAL ENCOUNTER (EMERGENCY)
Age: 75
Discharge: HOME | End: 2025-07-21
Payer: MEDICAID

## 2025-07-21 VITALS
HEIGHT: 61 IN | TEMPERATURE: 97.6 F | RESPIRATION RATE: 20 BRPM | WEIGHT: 120 LBS | OXYGEN SATURATION: 95 % | DIASTOLIC BLOOD PRESSURE: 60 MMHG | BODY MASS INDEX: 22.66 KG/M2 | SYSTOLIC BLOOD PRESSURE: 129 MMHG | HEART RATE: 65 BPM

## 2025-07-21 DIAGNOSIS — R60.0 PERIPHERAL EDEMA: ICD-10-CM

## 2025-07-21 DIAGNOSIS — H66.90 ACUTE OTITIS MEDIA, UNSPECIFIED OTITIS MEDIA TYPE: Primary | ICD-10-CM

## 2025-07-21 LAB
ALBUMIN SERPL-MCNC: 4.3 G/DL (ref 3.4–5)
ALBUMIN/GLOB SERPL: 1.8 {RATIO} (ref 1.1–2.2)
ALP SERPL-CCNC: 89 U/L (ref 40–129)
ALT SERPL-CCNC: 22 U/L (ref 10–40)
ANION GAP SERPL CALCULATED.3IONS-SCNC: 14 MMOL/L (ref 3–16)
AST SERPL-CCNC: 25 U/L (ref 15–37)
BASOPHILS # BLD: 0 K/UL (ref 0–0.2)
BASOPHILS NFR BLD: 0.4 %
BILIRUB SERPL-MCNC: 0.6 MG/DL (ref 0–1)
BUN SERPL-MCNC: 5 MG/DL (ref 7–20)
CALCIUM SERPL-MCNC: 9.5 MG/DL (ref 8.3–10.6)
CHLORIDE SERPL-SCNC: 98 MMOL/L (ref 99–110)
CO2 SERPL-SCNC: 23 MMOL/L (ref 21–32)
CREAT SERPL-MCNC: 0.7 MG/DL (ref 0.6–1.2)
DEPRECATED RDW RBC AUTO: 14.7 % (ref 12.4–15.4)
EOSINOPHIL # BLD: 0.1 K/UL (ref 0–0.6)
EOSINOPHIL NFR BLD: 1.4 %
GFR SERPLBLD CREATININE-BSD FMLA CKD-EPI: 90 ML/MIN/{1.73_M2}
GLUCOSE SERPL-MCNC: 231 MG/DL (ref 70–99)
HCT VFR BLD AUTO: 35.8 % (ref 36–48)
HGB BLD-MCNC: 12.1 G/DL (ref 12–16)
LYMPHOCYTES # BLD: 1.2 K/UL (ref 1–5.1)
LYMPHOCYTES NFR BLD: 15.8 %
MCH RBC QN AUTO: 29.1 PG (ref 26–34)
MCHC RBC AUTO-ENTMCNC: 33.9 G/DL (ref 31–36)
MCV RBC AUTO: 85.8 FL (ref 80–100)
MONOCYTES # BLD: 0.5 K/UL (ref 0–1.3)
MONOCYTES NFR BLD: 6.6 %
NEUTROPHILS # BLD: 5.5 K/UL (ref 1.7–7.7)
NEUTROPHILS NFR BLD: 75.8 %
NT-PROBNP SERPL-MCNC: 53 PG/ML (ref 0–449)
PLATELET # BLD AUTO: 174 K/UL (ref 135–450)
PMV BLD AUTO: 8.6 FL (ref 5–10.5)
POTASSIUM SERPL-SCNC: 4.4 MMOL/L (ref 3.5–5.1)
PROT SERPL-MCNC: 6.7 G/DL (ref 6.4–8.2)
RBC # BLD AUTO: 4.17 M/UL (ref 4–5.2)
SODIUM SERPL-SCNC: 135 MMOL/L (ref 136–145)
TROPONIN, HIGH SENSITIVITY: <6 NG/L (ref 0–14)
WBC # BLD AUTO: 7.3 K/UL (ref 4–11)

## 2025-07-21 PROCEDURE — 83880 ASSAY OF NATRIURETIC PEPTIDE: CPT

## 2025-07-21 PROCEDURE — 80053 COMPREHEN METABOLIC PANEL: CPT

## 2025-07-21 PROCEDURE — 85025 COMPLETE CBC W/AUTO DIFF WBC: CPT

## 2025-07-21 PROCEDURE — 93005 ELECTROCARDIOGRAM TRACING: CPT | Performed by: PHYSICIAN ASSISTANT

## 2025-07-21 PROCEDURE — 99284 EMERGENCY DEPT VISIT MOD MDM: CPT

## 2025-07-21 PROCEDURE — 84484 ASSAY OF TROPONIN QUANT: CPT

## 2025-07-21 RX ORDER — AMOXICILLIN 875 MG/1
875 TABLET, COATED ORAL 2 TIMES DAILY
Qty: 14 TABLET | Refills: 0 | Status: SHIPPED | OUTPATIENT
Start: 2025-07-21 | End: 2025-07-28

## 2025-07-21 ASSESSMENT — PAIN SCALES - GENERAL: PAINLEVEL_OUTOF10: 0

## 2025-07-21 ASSESSMENT — PAIN - FUNCTIONAL ASSESSMENT: PAIN_FUNCTIONAL_ASSESSMENT: 0-10

## 2025-07-21 NOTE — ED PROVIDER NOTES
[facility] EMERGENCY DEPARTMENT  EMERGENCY DEPARTMENT ENCOUNTER        Pt Name: Josey Gee  MRN: [id number]  Birthdate 1950  Date of evaluation: 7/21/2025  Provider: Aryan Majano PA-C  PCP: System, Referring Not In (Inactive)  Note Started: 2:56 PM EDT 7/21/25      CAR. I have evaluated this patient.        CHIEF COMPLAINT       Chief Complaint   Patient presents with    Swelling     Patient with family. States concerned for arm, leg and face swelling \"on and off for a couple of weeks\". Has appt with PCP 8/1, has multiple complaints.        HISTORY OF PRESENT ILLNESS: 1 or more Elements     History From: patient  Limitations to history : Language history is translated by son-in-law at bedside per patient's request    Josey Gee is a 74 y.o. female who presents to the emergency department with a 1 week history mostly complaining of some right-sided facial pain, swelling and right ear pain.  Denies difficulty swallowing, vomiting, fevers.  States that she had similar symptoms a couple years ago and was given some eardrops that helped.  Denies any drainage or injury or trauma.  Has history of hypertension and diabetes.  Secondarily also complaining of some swelling in the feet and legs.  Denies chest pain, shortness of breath, urinary or bowel symptoms or any other symptoms.    Nursing Notes were all reviewed and agreed with or any disagreements were addressed in the HPI.    REVIEW OF SYSTEMS :      Review of Systems    Positives and Pertinent negatives as per HPI.     SURGICAL HISTORY     Past Surgical History:   Procedure Laterality Date    COLONOSCOPY N/A 7/10/2024    COLONOSCOPY POLYPECTOMY SNARE/BIOPSY performed by Andrea Hagan MD at [facility] ENDOSCOPY    PACEMAKER INSERTION      UPPER GASTROINTESTINAL ENDOSCOPY N/A 7/10/2024    ESOPHAGOGASTRODUODENOSCOPY BIOPSY performed by Andrea Hagan MD at [facility] ENDOSCOPY       CURRENTMEDICATIONS       Previous Medications    ALBUTEROL

## 2025-07-21 NOTE — ED PROVIDER NOTES
The Ekg interpreted by me in the absence of a cardiologist shows.  Atrial paced rhythm.  Rate of 62.  Axis normal.  QTc appropriate.  No specific ST or T wave abnormality.  No significant change from prior 11-.      I only performed EKG interpretation and was not otherwise involved in the care of this patient.       Tiago Mcintosh MD  07/21/25 [id number]

## 2025-07-22 LAB
EKG ATRIAL RATE: 62 BPM
EKG DIAGNOSIS: NORMAL
EKG P-R INTERVAL: 154 MS
EKG Q-T INTERVAL: 420 MS
EKG QRS DURATION: 80 MS
EKG QTC CALCULATION (BAZETT): 426 MS
EKG R AXIS: 16 DEGREES
EKG T AXIS: 54 DEGREES
EKG VENTRICULAR RATE: 62 BPM

## 2025-07-22 PROCEDURE — 93010 ELECTROCARDIOGRAM REPORT: CPT | Performed by: INTERNAL MEDICINE

## (undated) DEVICE — SOLUTION IRRIG 500ML STRL H2O NONPYROGENIC

## (undated) DEVICE — FORCEPS BX L240CM WRK CHN 2.8MM STD CAP W/ NDL MIC MESH

## (undated) DEVICE — SNARES COLD OVAL 10MM THIN

## (undated) DEVICE — BW-412T DISP COMBO CLEANING BRUSH: Brand: SINGLE USE COMBINATION CLEANING BRUSH

## (undated) DEVICE — AIR/WATER CLEANING ADAPTER FOR OLYMPUS® GI ENDOSCOPE: Brand: BULLDOG®

## (undated) DEVICE — TRAP SPEC RETRV CLR PLAS POLYP IN LN SUCT QUIK CTCH

## (undated) DEVICE — ENDOSCOPIC KIT 6X3/16 FT COLON W/ 1.1 OZ 2 GWN W/O BRSH

## (undated) DEVICE — SINGLE USE AIR/WATER, SUCTION AND BIOPSY VALVES SET: Brand: ORCAPOD™

## (undated) DEVICE — MOUTHPIECE ENDOSCP L CTRL OPN AND SIDE PORTS DISP